# Patient Record
Sex: MALE | Race: WHITE | HISPANIC OR LATINO | Employment: FULL TIME | URBAN - METROPOLITAN AREA
[De-identification: names, ages, dates, MRNs, and addresses within clinical notes are randomized per-mention and may not be internally consistent; named-entity substitution may affect disease eponyms.]

---

## 2017-07-30 ENCOUNTER — APPOINTMENT (EMERGENCY)
Dept: CT IMAGING | Facility: HOSPITAL | Age: 42
DRG: 463 | End: 2017-07-30
Payer: COMMERCIAL

## 2017-07-30 ENCOUNTER — HOSPITAL ENCOUNTER (INPATIENT)
Facility: HOSPITAL | Age: 42
LOS: 3 days | Discharge: HOME | DRG: 463 | End: 2017-08-03
Attending: EMERGENCY MEDICINE | Admitting: INTERNAL MEDICINE
Payer: COMMERCIAL

## 2017-07-30 DIAGNOSIS — N20.0 KIDNEY STONE ON LEFT SIDE: Primary | ICD-10-CM

## 2017-07-30 DIAGNOSIS — D72.829 LEUKOCYTOSIS: ICD-10-CM

## 2017-07-30 DIAGNOSIS — N20.0 LEFT NEPHROLITHIASIS: ICD-10-CM

## 2017-07-30 DIAGNOSIS — N13.30 HYDRONEPHROSIS, LEFT: ICD-10-CM

## 2017-07-30 LAB
ALBUMIN SERPL BCP-MCNC: 3.9 G/DL (ref 3.5–5)
ALP SERPL-CCNC: 105 U/L (ref 46–116)
ALT SERPL W P-5'-P-CCNC: 38 U/L (ref 12–78)
ANION GAP SERPL CALCULATED.3IONS-SCNC: 11 MMOL/L (ref 4–13)
AST SERPL W P-5'-P-CCNC: 13 U/L (ref 5–45)
BASOPHILS # BLD AUTO: 0.02 THOUSANDS/ΜL (ref 0–0.1)
BASOPHILS NFR BLD AUTO: 0 % (ref 0–1)
BILIRUB SERPL-MCNC: 0.7 MG/DL (ref 0.2–1)
BUN SERPL-MCNC: 17 MG/DL (ref 5–25)
CALCIUM SERPL-MCNC: 9.4 MG/DL (ref 8.3–10.1)
CHLORIDE SERPL-SCNC: 100 MMOL/L (ref 100–108)
CO2 SERPL-SCNC: 23 MMOL/L (ref 21–32)
CREAT SERPL-MCNC: 1.34 MG/DL (ref 0.6–1.3)
EOSINOPHIL # BLD AUTO: 0.01 THOUSAND/ΜL (ref 0–0.61)
EOSINOPHIL NFR BLD AUTO: 0 % (ref 0–6)
ERYTHROCYTE [DISTWIDTH] IN BLOOD BY AUTOMATED COUNT: 12.5 % (ref 11.6–15.1)
GFR SERPL CREATININE-BSD FRML MDRD: 64 ML/MIN/1.73SQ M
GLUCOSE SERPL-MCNC: 116 MG/DL (ref 65–140)
HCT VFR BLD AUTO: 47.5 % (ref 36.5–49.3)
HGB BLD-MCNC: 17.1 G/DL (ref 12–17)
LIPASE SERPL-CCNC: 160 U/L (ref 73–393)
LYMPHOCYTES # BLD AUTO: 2.17 THOUSANDS/ΜL (ref 0.6–4.47)
LYMPHOCYTES NFR BLD AUTO: 10 % (ref 14–44)
MCH RBC QN AUTO: 32.4 PG (ref 26.8–34.3)
MCHC RBC AUTO-ENTMCNC: 36 G/DL (ref 31.4–37.4)
MCV RBC AUTO: 90 FL (ref 82–98)
MONOCYTES # BLD AUTO: 1.57 THOUSAND/ΜL (ref 0.17–1.22)
MONOCYTES NFR BLD AUTO: 7 % (ref 4–12)
NEUTROPHILS # BLD AUTO: 17.83 THOUSANDS/ΜL (ref 1.85–7.62)
NEUTS SEG NFR BLD AUTO: 82 % (ref 43–75)
NRBC BLD AUTO-RTO: 0 /100 WBCS
PLATELET # BLD AUTO: 209 THOUSANDS/UL (ref 149–390)
PMV BLD AUTO: 10.5 FL (ref 8.9–12.7)
POTASSIUM SERPL-SCNC: 4 MMOL/L (ref 3.5–5.3)
PROT SERPL-MCNC: 8.2 G/DL (ref 6.4–8.2)
RBC # BLD AUTO: 5.28 MILLION/UL (ref 3.88–5.62)
SODIUM SERPL-SCNC: 134 MMOL/L (ref 136–145)
WBC # BLD AUTO: 21.7 THOUSAND/UL (ref 4.31–10.16)

## 2017-07-30 PROCEDURE — 96375 TX/PRO/DX INJ NEW DRUG ADDON: CPT

## 2017-07-30 PROCEDURE — 83690 ASSAY OF LIPASE: CPT | Performed by: EMERGENCY MEDICINE

## 2017-07-30 PROCEDURE — 85025 COMPLETE CBC W/AUTO DIFF WBC: CPT | Performed by: EMERGENCY MEDICINE

## 2017-07-30 PROCEDURE — 80053 COMPREHEN METABOLIC PANEL: CPT | Performed by: EMERGENCY MEDICINE

## 2017-07-30 PROCEDURE — 74177 CT ABD & PELVIS W/CONTRAST: CPT

## 2017-07-30 PROCEDURE — 96365 THER/PROPH/DIAG IV INF INIT: CPT

## 2017-07-30 PROCEDURE — 36415 COLL VENOUS BLD VENIPUNCTURE: CPT | Performed by: EMERGENCY MEDICINE

## 2017-07-30 RX ORDER — KETOROLAC TROMETHAMINE 30 MG/ML
15 INJECTION, SOLUTION INTRAMUSCULAR; INTRAVENOUS ONCE
Status: COMPLETED | OUTPATIENT
Start: 2017-07-30 | End: 2017-07-30

## 2017-07-30 RX ORDER — ONDANSETRON 2 MG/ML
4 INJECTION INTRAMUSCULAR; INTRAVENOUS ONCE
Status: COMPLETED | OUTPATIENT
Start: 2017-07-30 | End: 2017-07-30

## 2017-07-30 RX ADMIN — CEFTRIAXONE 1000 MG: 1 INJECTION, POWDER, FOR SOLUTION INTRAMUSCULAR; INTRAVENOUS at 23:32

## 2017-07-30 RX ADMIN — HYDROMORPHONE HYDROCHLORIDE 1 MG: 1 INJECTION, SOLUTION INTRAMUSCULAR; INTRAVENOUS; SUBCUTANEOUS at 22:36

## 2017-07-30 RX ADMIN — KETOROLAC TROMETHAMINE 15 MG: 30 INJECTION, SOLUTION INTRAMUSCULAR at 21:23

## 2017-07-30 RX ADMIN — IOHEXOL 100 ML: 350 INJECTION, SOLUTION INTRAVENOUS at 22:00

## 2017-07-30 RX ADMIN — ONDANSETRON 4 MG: 2 INJECTION INTRAMUSCULAR; INTRAVENOUS at 21:24

## 2017-07-31 ENCOUNTER — APPOINTMENT (INPATIENT)
Dept: RADIOLOGY | Facility: HOSPITAL | Age: 42
DRG: 463 | End: 2017-07-31
Payer: COMMERCIAL

## 2017-07-31 ENCOUNTER — ANESTHESIA (INPATIENT)
Dept: PERIOP | Facility: HOSPITAL | Age: 42
DRG: 463 | End: 2017-07-31
Payer: COMMERCIAL

## 2017-07-31 ENCOUNTER — ANESTHESIA EVENT (INPATIENT)
Dept: PERIOP | Facility: HOSPITAL | Age: 42
DRG: 463 | End: 2017-07-31
Payer: COMMERCIAL

## 2017-07-31 PROBLEM — D72.829 LEUKOCYTOSIS: Status: ACTIVE | Noted: 2017-07-31

## 2017-07-31 PROBLEM — N13.30 HYDRONEPHROSIS, LEFT: Status: ACTIVE | Noted: 2017-07-31

## 2017-07-31 PROBLEM — N17.9 AKI (ACUTE KIDNEY INJURY) (HCC): Status: ACTIVE | Noted: 2017-07-31

## 2017-07-31 PROBLEM — N20.0 KIDNEY STONE ON LEFT SIDE: Status: ACTIVE | Noted: 2017-07-31

## 2017-07-31 PROBLEM — N20.0 LEFT NEPHROLITHIASIS: Status: ACTIVE | Noted: 2017-07-31

## 2017-07-31 PROBLEM — E78.5 HLD (HYPERLIPIDEMIA): Status: ACTIVE | Noted: 2017-07-31

## 2017-07-31 PROBLEM — Z72.0 TOBACCO USE: Status: ACTIVE | Noted: 2017-07-31

## 2017-07-31 PROBLEM — E87.1 HYPONATREMIA: Status: ACTIVE | Noted: 2017-07-31

## 2017-07-31 LAB
ANION GAP SERPL CALCULATED.3IONS-SCNC: 11 MMOL/L (ref 4–13)
APTT PPP: 32 SECONDS (ref 23–35)
BACTERIA UR QL AUTO: ABNORMAL /HPF
BACTERIA UR QL AUTO: ABNORMAL /HPF
BILIRUB UR QL STRIP: ABNORMAL
BILIRUB UR QL STRIP: NEGATIVE
BUN SERPL-MCNC: 18 MG/DL (ref 5–25)
CALCIUM SERPL-MCNC: 8.9 MG/DL (ref 8.3–10.1)
CHLORIDE SERPL-SCNC: 100 MMOL/L (ref 100–108)
CLARITY UR: ABNORMAL
CLARITY UR: ABNORMAL
CO2 SERPL-SCNC: 24 MMOL/L (ref 21–32)
COLOR UR: ABNORMAL
COLOR UR: YELLOW
CREAT SERPL-MCNC: 1.47 MG/DL (ref 0.6–1.3)
ERYTHROCYTE [DISTWIDTH] IN BLOOD BY AUTOMATED COUNT: 12.4 % (ref 11.6–15.1)
GFR SERPL CREATININE-BSD FRML MDRD: 58 ML/MIN/1.73SQ M
GLUCOSE P FAST SERPL-MCNC: 115 MG/DL (ref 65–99)
GLUCOSE SERPL-MCNC: 115 MG/DL (ref 65–140)
GLUCOSE UR STRIP-MCNC: NEGATIVE MG/DL
GLUCOSE UR STRIP-MCNC: NEGATIVE MG/DL
HCT VFR BLD AUTO: 46.9 % (ref 36.5–49.3)
HGB BLD-MCNC: 16.8 G/DL (ref 12–17)
HGB UR QL STRIP.AUTO: ABNORMAL
HGB UR QL STRIP.AUTO: ABNORMAL
INR PPP: 1.05 (ref 0.86–1.16)
KETONES UR STRIP-MCNC: ABNORMAL MG/DL
KETONES UR STRIP-MCNC: ABNORMAL MG/DL
LEUKOCYTE ESTERASE UR QL STRIP: ABNORMAL
LEUKOCYTE ESTERASE UR QL STRIP: NEGATIVE
MAGNESIUM SERPL-MCNC: 1.8 MG/DL (ref 1.6–2.6)
MCH RBC QN AUTO: 32.2 PG (ref 26.8–34.3)
MCHC RBC AUTO-ENTMCNC: 35.8 G/DL (ref 31.4–37.4)
MCV RBC AUTO: 90 FL (ref 82–98)
NITRITE UR QL STRIP: NEGATIVE
NITRITE UR QL STRIP: POSITIVE
NON-SQ EPI CELLS URNS QL MICRO: ABNORMAL /HPF
NON-SQ EPI CELLS URNS QL MICRO: ABNORMAL /HPF
PH UR STRIP.AUTO: 6 [PH] (ref 4.5–8)
PH UR STRIP.AUTO: 6.5 [PH] (ref 4.5–8)
PLATELET # BLD AUTO: 202 THOUSANDS/UL (ref 149–390)
PMV BLD AUTO: 10.8 FL (ref 8.9–12.7)
POTASSIUM SERPL-SCNC: 3.8 MMOL/L (ref 3.5–5.3)
PROT UR STRIP-MCNC: >=300 MG/DL
PROT UR STRIP-MCNC: ABNORMAL MG/DL
PROTHROMBIN TIME: 13.9 SECONDS (ref 12.1–14.4)
RBC # BLD AUTO: 5.21 MILLION/UL (ref 3.88–5.62)
RBC #/AREA URNS AUTO: ABNORMAL /HPF
RBC #/AREA URNS AUTO: ABNORMAL /HPF
SODIUM SERPL-SCNC: 135 MMOL/L (ref 136–145)
SP GR UR STRIP.AUTO: 1.01 (ref 1–1.03)
SP GR UR STRIP.AUTO: 1.01 (ref 1–1.03)
UROBILINOGEN UR QL STRIP.AUTO: 1 E.U./DL
UROBILINOGEN UR QL STRIP.AUTO: 2 E.U./DL
WBC # BLD AUTO: 22.63 THOUSAND/UL (ref 4.31–10.16)
WBC #/AREA URNS AUTO: ABNORMAL /HPF
WBC #/AREA URNS AUTO: ABNORMAL /HPF

## 2017-07-31 PROCEDURE — 0T778DZ DILATION OF LEFT URETER WITH INTRALUMINAL DEVICE, VIA NATURAL OR ARTIFICIAL OPENING ENDOSCOPIC: ICD-10-PCS | Performed by: UROLOGY

## 2017-07-31 PROCEDURE — BT1F1ZZ FLUOROSCOPY OF LEFT KIDNEY, URETER AND BLADDER USING LOW OSMOLAR CONTRAST: ICD-10-PCS | Performed by: UROLOGY

## 2017-07-31 PROCEDURE — 83735 ASSAY OF MAGNESIUM: CPT | Performed by: PHYSICIAN ASSISTANT

## 2017-07-31 PROCEDURE — 99285 EMERGENCY DEPT VISIT HI MDM: CPT

## 2017-07-31 PROCEDURE — C1769 GUIDE WIRE: HCPCS | Performed by: UROLOGY

## 2017-07-31 PROCEDURE — 85027 COMPLETE CBC AUTOMATED: CPT | Performed by: PHYSICIAN ASSISTANT

## 2017-07-31 PROCEDURE — 85730 THROMBOPLASTIN TIME PARTIAL: CPT | Performed by: PHYSICIAN ASSISTANT

## 2017-07-31 PROCEDURE — 81001 URINALYSIS AUTO W/SCOPE: CPT | Performed by: PHYSICIAN ASSISTANT

## 2017-07-31 PROCEDURE — 80048 BASIC METABOLIC PNL TOTAL CA: CPT | Performed by: PHYSICIAN ASSISTANT

## 2017-07-31 PROCEDURE — 87077 CULTURE AEROBIC IDENTIFY: CPT | Performed by: PHYSICIAN ASSISTANT

## 2017-07-31 PROCEDURE — C2617 STENT, NON-COR, TEM W/O DEL: HCPCS | Performed by: UROLOGY

## 2017-07-31 PROCEDURE — 85610 PROTHROMBIN TIME: CPT | Performed by: PHYSICIAN ASSISTANT

## 2017-07-31 PROCEDURE — 87186 SC STD MICRODIL/AGAR DIL: CPT | Performed by: PHYSICIAN ASSISTANT

## 2017-07-31 PROCEDURE — 87086 URINE CULTURE/COLONY COUNT: CPT | Performed by: PHYSICIAN ASSISTANT

## 2017-07-31 PROCEDURE — 74420 UROGRAPHY RTRGR +-KUB: CPT

## 2017-07-31 DEVICE — STENT URETERAL 6 FR 26CM INLAY OPTIMA: Type: IMPLANTABLE DEVICE | Site: URETER | Status: FUNCTIONAL

## 2017-07-31 RX ORDER — GLYCOPYRROLATE 0.2 MG/ML
INJECTION INTRAMUSCULAR; INTRAVENOUS AS NEEDED
Status: DISCONTINUED | OUTPATIENT
Start: 2017-07-31 | End: 2017-07-31 | Stop reason: SURG

## 2017-07-31 RX ORDER — LIDOCAINE HYDROCHLORIDE 10 MG/ML
INJECTION, SOLUTION INFILTRATION; PERINEURAL AS NEEDED
Status: DISCONTINUED | OUTPATIENT
Start: 2017-07-31 | End: 2017-07-31 | Stop reason: SURG

## 2017-07-31 RX ORDER — METOCLOPRAMIDE HYDROCHLORIDE 5 MG/ML
10 INJECTION INTRAMUSCULAR; INTRAVENOUS ONCE AS NEEDED
Status: DISCONTINUED | OUTPATIENT
Start: 2017-07-31 | End: 2017-07-31 | Stop reason: HOSPADM

## 2017-07-31 RX ORDER — CALCIUM CARBONATE 200(500)MG
1000 TABLET,CHEWABLE ORAL DAILY PRN
Status: DISCONTINUED | OUTPATIENT
Start: 2017-07-31 | End: 2017-08-03 | Stop reason: HOSPADM

## 2017-07-31 RX ORDER — DEXMEDETOMIDINE HYDROCHLORIDE 100 UG/ML
INJECTION, SOLUTION INTRAVENOUS AS NEEDED
Status: DISCONTINUED | OUTPATIENT
Start: 2017-07-31 | End: 2017-07-31 | Stop reason: SURG

## 2017-07-31 RX ORDER — PROMETHAZINE HYDROCHLORIDE 25 MG/ML
25 INJECTION, SOLUTION INTRAMUSCULAR; INTRAVENOUS ONCE AS NEEDED
Status: DISCONTINUED | OUTPATIENT
Start: 2017-07-31 | End: 2017-07-31 | Stop reason: HOSPADM

## 2017-07-31 RX ORDER — ONDANSETRON 2 MG/ML
4 INJECTION INTRAMUSCULAR; INTRAVENOUS EVERY 6 HOURS PRN
Status: DISCONTINUED | OUTPATIENT
Start: 2017-07-31 | End: 2017-08-03 | Stop reason: HOSPADM

## 2017-07-31 RX ORDER — SODIUM CHLORIDE, SODIUM LACTATE, POTASSIUM CHLORIDE, CALCIUM CHLORIDE 600; 310; 30; 20 MG/100ML; MG/100ML; MG/100ML; MG/100ML
INJECTION, SOLUTION INTRAVENOUS CONTINUOUS PRN
Status: DISCONTINUED | OUTPATIENT
Start: 2017-07-31 | End: 2017-07-31 | Stop reason: SURG

## 2017-07-31 RX ORDER — FENTANYL CITRATE/PF 50 MCG/ML
25 SYRINGE (ML) INJECTION
Status: DISCONTINUED | OUTPATIENT
Start: 2017-07-31 | End: 2017-07-31 | Stop reason: HOSPADM

## 2017-07-31 RX ORDER — TAMSULOSIN HYDROCHLORIDE 0.4 MG/1
0.4 CAPSULE ORAL
Status: DISCONTINUED | OUTPATIENT
Start: 2017-07-31 | End: 2017-08-03 | Stop reason: HOSPADM

## 2017-07-31 RX ORDER — NICOTINE 21 MG/24HR
1 PATCH, TRANSDERMAL 24 HOURS TRANSDERMAL DAILY
Status: DISCONTINUED | OUTPATIENT
Start: 2017-07-31 | End: 2017-08-03 | Stop reason: HOSPADM

## 2017-07-31 RX ORDER — FENTANYL CITRATE 50 UG/ML
INJECTION, SOLUTION INTRAMUSCULAR; INTRAVENOUS AS NEEDED
Status: DISCONTINUED | OUTPATIENT
Start: 2017-07-31 | End: 2017-07-31 | Stop reason: SURG

## 2017-07-31 RX ORDER — HYDROMORPHONE HCL 110MG/55ML
1.5 PATIENT CONTROLLED ANALGESIA SYRINGE INTRAVENOUS ONCE
Status: COMPLETED | OUTPATIENT
Start: 2017-07-31 | End: 2017-07-31

## 2017-07-31 RX ORDER — ONDANSETRON 2 MG/ML
4 INJECTION INTRAMUSCULAR; INTRAVENOUS ONCE AS NEEDED
Status: DISCONTINUED | OUTPATIENT
Start: 2017-07-31 | End: 2017-07-31 | Stop reason: HOSPADM

## 2017-07-31 RX ORDER — ONDANSETRON 2 MG/ML
INJECTION INTRAMUSCULAR; INTRAVENOUS AS NEEDED
Status: DISCONTINUED | OUTPATIENT
Start: 2017-07-31 | End: 2017-07-31 | Stop reason: SURG

## 2017-07-31 RX ORDER — ACETAMINOPHEN 325 MG/1
650 TABLET ORAL EVERY 6 HOURS PRN
Status: DISCONTINUED | OUTPATIENT
Start: 2017-07-31 | End: 2017-08-03 | Stop reason: HOSPADM

## 2017-07-31 RX ORDER — PROPOFOL 10 MG/ML
INJECTION, EMULSION INTRAVENOUS AS NEEDED
Status: DISCONTINUED | OUTPATIENT
Start: 2017-07-31 | End: 2017-07-31 | Stop reason: SURG

## 2017-07-31 RX ORDER — OXYCODONE HYDROCHLORIDE 5 MG/1
5 TABLET ORAL EVERY 4 HOURS PRN
Status: DISCONTINUED | OUTPATIENT
Start: 2017-07-31 | End: 2017-08-03

## 2017-07-31 RX ORDER — SODIUM CHLORIDE 9 MG/ML
125 INJECTION, SOLUTION INTRAVENOUS CONTINUOUS
Status: DISCONTINUED | OUTPATIENT
Start: 2017-07-31 | End: 2017-08-01

## 2017-07-31 RX ORDER — SUCCINYLCHOLINE CHLORIDE 20 MG/ML
INJECTION INTRAMUSCULAR; INTRAVENOUS AS NEEDED
Status: DISCONTINUED | OUTPATIENT
Start: 2017-07-31 | End: 2017-07-31 | Stop reason: SURG

## 2017-07-31 RX ORDER — MIDAZOLAM HYDROCHLORIDE 1 MG/ML
INJECTION INTRAMUSCULAR; INTRAVENOUS AS NEEDED
Status: DISCONTINUED | OUTPATIENT
Start: 2017-07-31 | End: 2017-07-31 | Stop reason: SURG

## 2017-07-31 RX ADMIN — SUCCINYLCHOLINE CHLORIDE 120 MG: 20 INJECTION, SOLUTION INTRAMUSCULAR; INTRAVENOUS at 16:30

## 2017-07-31 RX ADMIN — PROPOFOL 200 MG: 10 INJECTION, EMULSION INTRAVENOUS at 16:30

## 2017-07-31 RX ADMIN — CEFTRIAXONE 1000 MG: 1 INJECTION, POWDER, FOR SOLUTION INTRAMUSCULAR; INTRAVENOUS at 21:03

## 2017-07-31 RX ADMIN — OXYCODONE HYDROCHLORIDE 5 MG: 5 TABLET ORAL at 02:51

## 2017-07-31 RX ADMIN — SODIUM CHLORIDE 125 ML/HR: 0.9 INJECTION, SOLUTION INTRAVENOUS at 17:15

## 2017-07-31 RX ADMIN — FENTANYL CITRATE 100 MCG: 50 INJECTION, SOLUTION INTRAMUSCULAR; INTRAVENOUS at 16:25

## 2017-07-31 RX ADMIN — NEOSTIGMINE METHYLSULFATE 4 MG: 1 INJECTION, SOLUTION INTRAMUSCULAR; INTRAVENOUS; SUBCUTANEOUS at 16:30

## 2017-07-31 RX ADMIN — ONDANSETRON 4 MG: 2 INJECTION INTRAMUSCULAR; INTRAVENOUS at 16:30

## 2017-07-31 RX ADMIN — NICOTINE 1 PATCH: 14 PATCH, EXTENDED RELEASE TRANSDERMAL at 09:20

## 2017-07-31 RX ADMIN — HYDROMORPHONE HYDROCHLORIDE 1 MG: 1 INJECTION, SOLUTION INTRAMUSCULAR; INTRAVENOUS; SUBCUTANEOUS at 09:21

## 2017-07-31 RX ADMIN — SODIUM CHLORIDE 125 ML/HR: 0.9 INJECTION, SOLUTION INTRAVENOUS at 02:43

## 2017-07-31 RX ADMIN — OXYCODONE HYDROCHLORIDE 5 MG: 5 TABLET ORAL at 10:38

## 2017-07-31 RX ADMIN — HYDROMORPHONE HYDROCHLORIDE 1.5 MG: 2 INJECTION, SOLUTION INTRAMUSCULAR; INTRAVENOUS; SUBCUTANEOUS at 13:53

## 2017-07-31 RX ADMIN — DEXMEDETOMIDINE 8 MCG: 100 INJECTION, SOLUTION, CONCENTRATE INTRAVENOUS at 16:35

## 2017-07-31 RX ADMIN — OXYCODONE HYDROCHLORIDE 5 MG: 5 TABLET ORAL at 19:13

## 2017-07-31 RX ADMIN — GLYCOPYRROLATE 0.2 MG: 0.2 INJECTION, SOLUTION INTRAMUSCULAR; INTRAVENOUS at 16:25

## 2017-07-31 RX ADMIN — ONDANSETRON 4 MG: 2 INJECTION INTRAMUSCULAR; INTRAVENOUS at 06:16

## 2017-07-31 RX ADMIN — NICOTINE 1 PATCH: 14 PATCH, EXTENDED RELEASE TRANSDERMAL at 21:06

## 2017-07-31 RX ADMIN — TAMSULOSIN HYDROCHLORIDE 0.4 MG: 0.4 CAPSULE ORAL at 16:30

## 2017-07-31 RX ADMIN — OXYCODONE HYDROCHLORIDE 5 MG: 5 TABLET ORAL at 14:36

## 2017-07-31 RX ADMIN — MIDAZOLAM HYDROCHLORIDE 2 MG: 1 INJECTION, SOLUTION INTRAMUSCULAR; INTRAVENOUS at 16:25

## 2017-07-31 RX ADMIN — HYDROMORPHONE HYDROCHLORIDE 1 MG: 1 INJECTION, SOLUTION INTRAMUSCULAR; INTRAVENOUS; SUBCUTANEOUS at 04:14

## 2017-07-31 RX ADMIN — HYDROMORPHONE HYDROCHLORIDE 1 MG: 1 INJECTION, SOLUTION INTRAMUSCULAR; INTRAVENOUS; SUBCUTANEOUS at 12:47

## 2017-07-31 RX ADMIN — HYDROMORPHONE HYDROCHLORIDE 1 MG: 1 INJECTION, SOLUTION INTRAMUSCULAR; INTRAVENOUS; SUBCUTANEOUS at 00:35

## 2017-07-31 RX ADMIN — LIDOCAINE HYDROCHLORIDE 100 MG: 10 INJECTION, SOLUTION INFILTRATION; PERINEURAL at 16:30

## 2017-07-31 RX ADMIN — SODIUM CHLORIDE, SODIUM LACTATE, POTASSIUM CHLORIDE, AND CALCIUM CHLORIDE: .6; .31; .03; .02 INJECTION, SOLUTION INTRAVENOUS at 16:22

## 2017-07-31 RX ADMIN — SODIUM CHLORIDE 125 ML/HR: 0.9 INJECTION, SOLUTION INTRAVENOUS at 10:40

## 2017-07-31 RX ADMIN — HYDROMORPHONE HYDROCHLORIDE 1 MG: 1 INJECTION, SOLUTION INTRAMUSCULAR; INTRAVENOUS; SUBCUTANEOUS at 17:52

## 2017-07-31 RX ADMIN — HYDROMORPHONE HYDROCHLORIDE 1 MG: 1 INJECTION, SOLUTION INTRAMUSCULAR; INTRAVENOUS; SUBCUTANEOUS at 21:10

## 2017-08-01 LAB
ANION GAP SERPL CALCULATED.3IONS-SCNC: 8 MMOL/L (ref 4–13)
BASOPHILS # BLD AUTO: 0.01 THOUSANDS/ΜL (ref 0–0.1)
BASOPHILS NFR BLD AUTO: 0 % (ref 0–1)
BUN SERPL-MCNC: 15 MG/DL (ref 5–25)
CALCIUM SERPL-MCNC: 9.3 MG/DL (ref 8.3–10.1)
CHLORIDE SERPL-SCNC: 101 MMOL/L (ref 100–108)
CHOLEST SERPL-MCNC: 246 MG/DL (ref 50–200)
CO2 SERPL-SCNC: 26 MMOL/L (ref 21–32)
CREAT SERPL-MCNC: 0.98 MG/DL (ref 0.6–1.3)
EOSINOPHIL # BLD AUTO: 0 THOUSAND/ΜL (ref 0–0.61)
EOSINOPHIL NFR BLD AUTO: 0 % (ref 0–6)
ERYTHROCYTE [DISTWIDTH] IN BLOOD BY AUTOMATED COUNT: 12.7 % (ref 11.6–15.1)
GFR SERPL CREATININE-BSD FRML MDRD: 93 ML/MIN/1.73SQ M
GLUCOSE SERPL-MCNC: 126 MG/DL (ref 65–140)
HCT VFR BLD AUTO: 41.9 % (ref 36.5–49.3)
HDLC SERPL-MCNC: 41 MG/DL (ref 40–60)
HGB BLD-MCNC: 14.8 G/DL (ref 12–17)
LDLC SERPL CALC-MCNC: 189 MG/DL (ref 0–100)
LYMPHOCYTES # BLD AUTO: 1.24 THOUSANDS/ΜL (ref 0.6–4.47)
LYMPHOCYTES NFR BLD AUTO: 8 % (ref 14–44)
MCH RBC QN AUTO: 32.4 PG (ref 26.8–34.3)
MCHC RBC AUTO-ENTMCNC: 35.3 G/DL (ref 31.4–37.4)
MCV RBC AUTO: 92 FL (ref 82–98)
MONOCYTES # BLD AUTO: 0.9 THOUSAND/ΜL (ref 0.17–1.22)
MONOCYTES NFR BLD AUTO: 6 % (ref 4–12)
NEUTROPHILS # BLD AUTO: 12.91 THOUSANDS/ΜL (ref 1.85–7.62)
NEUTS SEG NFR BLD AUTO: 85 % (ref 43–75)
NRBC BLD AUTO-RTO: 0 /100 WBCS
PLATELET # BLD AUTO: 182 THOUSANDS/UL (ref 149–390)
PMV BLD AUTO: 11 FL (ref 8.9–12.7)
POTASSIUM SERPL-SCNC: 4.3 MMOL/L (ref 3.5–5.3)
RBC # BLD AUTO: 4.57 MILLION/UL (ref 3.88–5.62)
SODIUM SERPL-SCNC: 135 MMOL/L (ref 136–145)
TRIGL SERPL-MCNC: 78 MG/DL
WBC # BLD AUTO: 15.13 THOUSAND/UL (ref 4.31–10.16)

## 2017-08-01 PROCEDURE — 80048 BASIC METABOLIC PNL TOTAL CA: CPT | Performed by: PHYSICIAN ASSISTANT

## 2017-08-01 PROCEDURE — 85025 COMPLETE CBC W/AUTO DIFF WBC: CPT | Performed by: PHYSICIAN ASSISTANT

## 2017-08-01 PROCEDURE — 80061 LIPID PANEL: CPT | Performed by: PHYSICIAN ASSISTANT

## 2017-08-01 RX ORDER — OXYBUTYNIN CHLORIDE 5 MG/1
5 TABLET ORAL 3 TIMES DAILY
Status: DISCONTINUED | OUTPATIENT
Start: 2017-08-01 | End: 2017-08-03 | Stop reason: HOSPADM

## 2017-08-01 RX ORDER — DOCUSATE SODIUM 100 MG/1
100 CAPSULE, LIQUID FILLED ORAL 2 TIMES DAILY
Status: DISCONTINUED | OUTPATIENT
Start: 2017-08-01 | End: 2017-08-03 | Stop reason: HOSPADM

## 2017-08-01 RX ORDER — KETOROLAC TROMETHAMINE 30 MG/ML
15 INJECTION, SOLUTION INTRAMUSCULAR; INTRAVENOUS EVERY 6 HOURS SCHEDULED
Status: COMPLETED | OUTPATIENT
Start: 2017-08-01 | End: 2017-08-03

## 2017-08-01 RX ADMIN — OXYCODONE HYDROCHLORIDE 5 MG: 5 TABLET ORAL at 01:24

## 2017-08-01 RX ADMIN — SODIUM CHLORIDE 125 ML/HR: 0.9 INJECTION, SOLUTION INTRAVENOUS at 01:26

## 2017-08-01 RX ADMIN — NICOTINE 1 PATCH: 14 PATCH, EXTENDED RELEASE TRANSDERMAL at 08:28

## 2017-08-01 RX ADMIN — CEFTRIAXONE 1000 MG: 1 INJECTION, POWDER, FOR SOLUTION INTRAMUSCULAR; INTRAVENOUS at 22:26

## 2017-08-01 RX ADMIN — KETOROLAC TROMETHAMINE 15 MG: 30 INJECTION, SOLUTION INTRAMUSCULAR at 17:31

## 2017-08-01 RX ADMIN — KETOROLAC TROMETHAMINE 15 MG: 30 INJECTION, SOLUTION INTRAMUSCULAR at 11:37

## 2017-08-01 RX ADMIN — TAMSULOSIN HYDROCHLORIDE 0.4 MG: 0.4 CAPSULE ORAL at 15:37

## 2017-08-01 RX ADMIN — OXYBUTYNIN CHLORIDE 5 MG: 5 TABLET ORAL at 15:37

## 2017-08-01 RX ADMIN — HYDROMORPHONE HYDROCHLORIDE 1 MG: 1 INJECTION, SOLUTION INTRAMUSCULAR; INTRAVENOUS; SUBCUTANEOUS at 04:06

## 2017-08-01 RX ADMIN — OXYBUTYNIN CHLORIDE 5 MG: 5 TABLET ORAL at 08:25

## 2017-08-01 RX ADMIN — HYDROMORPHONE HYDROCHLORIDE 1 MG: 1 INJECTION, SOLUTION INTRAMUSCULAR; INTRAVENOUS; SUBCUTANEOUS at 08:27

## 2017-08-01 RX ADMIN — SODIUM CHLORIDE 125 ML/HR: 0.9 INJECTION, SOLUTION INTRAVENOUS at 10:21

## 2017-08-01 RX ADMIN — HYDROMORPHONE HYDROCHLORIDE 1 MG: 1 INJECTION, SOLUTION INTRAMUSCULAR; INTRAVENOUS; SUBCUTANEOUS at 20:33

## 2017-08-01 RX ADMIN — HYDROMORPHONE HYDROCHLORIDE 1 MG: 1 INJECTION, SOLUTION INTRAMUSCULAR; INTRAVENOUS; SUBCUTANEOUS at 00:10

## 2017-08-01 RX ADMIN — DOCUSATE SODIUM 100 MG: 100 CAPSULE, LIQUID FILLED ORAL at 08:25

## 2017-08-01 RX ADMIN — OXYCODONE HYDROCHLORIDE 5 MG: 5 TABLET ORAL at 17:31

## 2017-08-01 RX ADMIN — DOCUSATE SODIUM 100 MG: 100 CAPSULE, LIQUID FILLED ORAL at 17:31

## 2017-08-01 RX ADMIN — HYDROMORPHONE HYDROCHLORIDE 1 MG: 1 INJECTION, SOLUTION INTRAMUSCULAR; INTRAVENOUS; SUBCUTANEOUS at 15:37

## 2017-08-01 RX ADMIN — OXYBUTYNIN CHLORIDE 5 MG: 5 TABLET ORAL at 22:25

## 2017-08-01 RX ADMIN — OXYCODONE HYDROCHLORIDE 5 MG: 5 TABLET ORAL at 06:17

## 2017-08-02 LAB
BACTERIA UR CULT: NORMAL
ERYTHROCYTE [DISTWIDTH] IN BLOOD BY AUTOMATED COUNT: 12.8 % (ref 11.6–15.1)
HCT VFR BLD AUTO: 40.4 % (ref 36.5–49.3)
HGB BLD-MCNC: 14.1 G/DL (ref 12–17)
MCH RBC QN AUTO: 32 PG (ref 26.8–34.3)
MCHC RBC AUTO-ENTMCNC: 34.9 G/DL (ref 31.4–37.4)
MCV RBC AUTO: 92 FL (ref 82–98)
PLATELET # BLD AUTO: 187 THOUSANDS/UL (ref 149–390)
PMV BLD AUTO: 10.6 FL (ref 8.9–12.7)
RBC # BLD AUTO: 4.41 MILLION/UL (ref 3.88–5.62)
WBC # BLD AUTO: 9.61 THOUSAND/UL (ref 4.31–10.16)

## 2017-08-02 PROCEDURE — 85027 COMPLETE CBC AUTOMATED: CPT | Performed by: INTERNAL MEDICINE

## 2017-08-02 RX ORDER — GUAIFENESIN/DEXTROMETHORPHAN 100-10MG/5
10 SYRUP ORAL EVERY 4 HOURS PRN
Status: DISCONTINUED | OUTPATIENT
Start: 2017-08-02 | End: 2017-08-03 | Stop reason: HOSPADM

## 2017-08-02 RX ADMIN — OXYBUTYNIN CHLORIDE 5 MG: 5 TABLET ORAL at 22:00

## 2017-08-02 RX ADMIN — HYDROMORPHONE HYDROCHLORIDE 1 MG: 1 INJECTION, SOLUTION INTRAMUSCULAR; INTRAVENOUS; SUBCUTANEOUS at 04:02

## 2017-08-02 RX ADMIN — GUAIFENESIN AND DEXTROMETHORPHAN 10 ML: 100; 10 SYRUP ORAL at 17:18

## 2017-08-02 RX ADMIN — OXYBUTYNIN CHLORIDE 5 MG: 5 TABLET ORAL at 08:28

## 2017-08-02 RX ADMIN — DOCUSATE SODIUM 100 MG: 100 CAPSULE, LIQUID FILLED ORAL at 17:18

## 2017-08-02 RX ADMIN — KETOROLAC TROMETHAMINE 15 MG: 30 INJECTION, SOLUTION INTRAMUSCULAR at 06:14

## 2017-08-02 RX ADMIN — OXYBUTYNIN CHLORIDE 5 MG: 5 TABLET ORAL at 17:18

## 2017-08-02 RX ADMIN — KETOROLAC TROMETHAMINE 15 MG: 30 INJECTION, SOLUTION INTRAMUSCULAR at 17:49

## 2017-08-02 RX ADMIN — DOCUSATE SODIUM 100 MG: 100 CAPSULE, LIQUID FILLED ORAL at 08:28

## 2017-08-02 RX ADMIN — KETOROLAC TROMETHAMINE 15 MG: 30 INJECTION, SOLUTION INTRAMUSCULAR at 12:57

## 2017-08-02 RX ADMIN — TAMSULOSIN HYDROCHLORIDE 0.4 MG: 0.4 CAPSULE ORAL at 17:18

## 2017-08-02 RX ADMIN — KETOROLAC TROMETHAMINE 15 MG: 30 INJECTION, SOLUTION INTRAMUSCULAR at 00:06

## 2017-08-02 RX ADMIN — HYDROMORPHONE HYDROCHLORIDE 1 MG: 1 INJECTION, SOLUTION INTRAMUSCULAR; INTRAVENOUS; SUBCUTANEOUS at 17:50

## 2017-08-02 RX ADMIN — CEFTRIAXONE 1000 MG: 1 INJECTION, POWDER, FOR SOLUTION INTRAMUSCULAR; INTRAVENOUS at 22:02

## 2017-08-02 RX ADMIN — NICOTINE 1 PATCH: 14 PATCH, EXTENDED RELEASE TRANSDERMAL at 08:28

## 2017-08-02 RX ADMIN — GUAIFENESIN AND DEXTROMETHORPHAN 10 ML: 100; 10 SYRUP ORAL at 22:14

## 2017-08-02 RX ADMIN — HYDROMORPHONE HYDROCHLORIDE 1 MG: 1 INJECTION, SOLUTION INTRAMUSCULAR; INTRAVENOUS; SUBCUTANEOUS at 22:02

## 2017-08-03 VITALS
TEMPERATURE: 99.1 F | BODY MASS INDEX: 36.1 KG/M2 | DIASTOLIC BLOOD PRESSURE: 82 MMHG | HEART RATE: 58 BPM | RESPIRATION RATE: 18 BRPM | SYSTOLIC BLOOD PRESSURE: 148 MMHG | WEIGHT: 230 LBS | HEIGHT: 67 IN | OXYGEN SATURATION: 97 %

## 2017-08-03 LAB
ANION GAP SERPL CALCULATED.3IONS-SCNC: 9 MMOL/L (ref 4–13)
BUN SERPL-MCNC: 24 MG/DL (ref 5–25)
CALCIUM SERPL-MCNC: 8.5 MG/DL (ref 8.3–10.1)
CHLORIDE SERPL-SCNC: 104 MMOL/L (ref 100–108)
CO2 SERPL-SCNC: 27 MMOL/L (ref 21–32)
CREAT SERPL-MCNC: 1.06 MG/DL (ref 0.6–1.3)
ERYTHROCYTE [DISTWIDTH] IN BLOOD BY AUTOMATED COUNT: 12.5 % (ref 11.6–15.1)
GFR SERPL CREATININE-BSD FRML MDRD: 85 ML/MIN/1.73SQ M
GLUCOSE SERPL-MCNC: 104 MG/DL (ref 65–140)
HCT VFR BLD AUTO: 39.4 % (ref 36.5–49.3)
HGB BLD-MCNC: 13.8 G/DL (ref 12–17)
MCH RBC QN AUTO: 32 PG (ref 26.8–34.3)
MCHC RBC AUTO-ENTMCNC: 35 G/DL (ref 31.4–37.4)
MCV RBC AUTO: 91 FL (ref 82–98)
PLATELET # BLD AUTO: 194 THOUSANDS/UL (ref 149–390)
PMV BLD AUTO: 10.3 FL (ref 8.9–12.7)
POTASSIUM SERPL-SCNC: 3.8 MMOL/L (ref 3.5–5.3)
RBC # BLD AUTO: 4.31 MILLION/UL (ref 3.88–5.62)
SODIUM SERPL-SCNC: 140 MMOL/L (ref 136–145)
WBC # BLD AUTO: 8 THOUSAND/UL (ref 4.31–10.16)

## 2017-08-03 PROCEDURE — 85027 COMPLETE CBC AUTOMATED: CPT | Performed by: INTERNAL MEDICINE

## 2017-08-03 PROCEDURE — 80048 BASIC METABOLIC PNL TOTAL CA: CPT | Performed by: INTERNAL MEDICINE

## 2017-08-03 RX ORDER — CEPHALEXIN 500 MG/1
500 CAPSULE ORAL EVERY 6 HOURS SCHEDULED
Qty: 40 CAPSULE | Refills: 0 | Status: SHIPPED | OUTPATIENT
Start: 2017-08-03 | End: 2017-08-03

## 2017-08-03 RX ORDER — OXYBUTYNIN CHLORIDE 5 MG/1
5 TABLET ORAL 3 TIMES DAILY
Qty: 90 TABLET | Refills: 0 | Status: SHIPPED | OUTPATIENT
Start: 2017-08-03 | End: 2017-08-03

## 2017-08-03 RX ORDER — TRAMADOL HYDROCHLORIDE 50 MG/1
50 TABLET ORAL EVERY 6 HOURS PRN
Qty: 30 TABLET | Refills: 0 | Status: SHIPPED | OUTPATIENT
Start: 2017-08-03 | End: 2017-08-03

## 2017-08-03 RX ORDER — CEPHALEXIN 500 MG/1
500 CAPSULE ORAL EVERY 6 HOURS SCHEDULED
Qty: 40 CAPSULE | Refills: 0 | Status: SHIPPED | OUTPATIENT
Start: 2017-08-03 | End: 2017-08-13

## 2017-08-03 RX ORDER — KETOROLAC TROMETHAMINE 30 MG/ML
15 INJECTION, SOLUTION INTRAMUSCULAR; INTRAVENOUS ONCE
Status: COMPLETED | OUTPATIENT
Start: 2017-08-03 | End: 2017-08-03

## 2017-08-03 RX ORDER — TRAMADOL HYDROCHLORIDE 50 MG/1
50 TABLET ORAL EVERY 6 HOURS PRN
Qty: 60 TABLET | Refills: 0 | Status: SHIPPED | OUTPATIENT
Start: 2017-08-03 | End: 2017-08-13

## 2017-08-03 RX ORDER — CEPHALEXIN 500 MG/1
500 CAPSULE ORAL EVERY 6 HOURS SCHEDULED
Status: DISCONTINUED | OUTPATIENT
Start: 2017-08-03 | End: 2017-08-03 | Stop reason: HOSPADM

## 2017-08-03 RX ORDER — TAMSULOSIN HYDROCHLORIDE 0.4 MG/1
0.4 CAPSULE ORAL
Qty: 30 CAPSULE | Refills: 0 | Status: SHIPPED | OUTPATIENT
Start: 2017-08-03 | End: 2017-08-03

## 2017-08-03 RX ORDER — TRAMADOL HYDROCHLORIDE 50 MG/1
50 TABLET ORAL EVERY 6 HOURS PRN
Status: DISCONTINUED | OUTPATIENT
Start: 2017-08-03 | End: 2017-08-03 | Stop reason: HOSPADM

## 2017-08-03 RX ORDER — OXYBUTYNIN CHLORIDE 5 MG/1
5 TABLET ORAL 3 TIMES DAILY
Qty: 90 TABLET | Refills: 0 | Status: SHIPPED | OUTPATIENT
Start: 2017-08-03

## 2017-08-03 RX ORDER — TAMSULOSIN HYDROCHLORIDE 0.4 MG/1
0.4 CAPSULE ORAL
Qty: 30 CAPSULE | Refills: 0 | Status: SHIPPED | OUTPATIENT
Start: 2017-08-03

## 2017-08-03 RX ADMIN — KETOROLAC TROMETHAMINE 15 MG: 30 INJECTION, SOLUTION INTRAMUSCULAR at 08:55

## 2017-08-03 RX ADMIN — OXYBUTYNIN CHLORIDE 5 MG: 5 TABLET ORAL at 08:55

## 2017-08-03 RX ADMIN — CEPHALEXIN 500 MG: 500 CAPSULE ORAL at 08:57

## 2017-08-03 RX ADMIN — HYDROMORPHONE HYDROCHLORIDE 1 MG: 1 INJECTION, SOLUTION INTRAMUSCULAR; INTRAVENOUS; SUBCUTANEOUS at 03:11

## 2017-08-03 RX ADMIN — NICOTINE 1 PATCH: 14 PATCH, EXTENDED RELEASE TRANSDERMAL at 08:58

## 2017-08-03 RX ADMIN — KETOROLAC TROMETHAMINE 15 MG: 30 INJECTION, SOLUTION INTRAMUSCULAR at 01:08

## 2017-08-03 RX ADMIN — DOCUSATE SODIUM 100 MG: 100 CAPSULE, LIQUID FILLED ORAL at 08:55

## 2017-08-03 RX ADMIN — KETOROLAC TROMETHAMINE 15 MG: 30 INJECTION, SOLUTION INTRAMUSCULAR at 05:08

## 2017-08-07 ENCOUNTER — ALLSCRIPTS OFFICE VISIT (OUTPATIENT)
Dept: OTHER | Facility: OTHER | Age: 42
End: 2017-08-07

## 2017-08-07 LAB
BILIRUB UR QL STRIP: NORMAL
CLARITY UR: NORMAL
COLOR UR: YELLOW
GLUCOSE (HISTORICAL): NORMAL
HGB UR QL STRIP.AUTO: NORMAL
KETONES UR STRIP-MCNC: NORMAL MG/DL
LEUKOCYTE ESTERASE UR QL STRIP: NORMAL
NITRITE UR QL STRIP: NORMAL
PH UR STRIP.AUTO: 6 [PH]
PROT UR STRIP-MCNC: NORMAL MG/DL
SP GR UR STRIP.AUTO: 1.02
UROBILINOGEN UR QL STRIP.AUTO: 1

## 2017-08-12 ENCOUNTER — TRANSCRIBE ORDERS (OUTPATIENT)
Dept: ADMINISTRATIVE | Facility: HOSPITAL | Age: 42
End: 2017-08-12

## 2017-08-12 ENCOUNTER — APPOINTMENT (OUTPATIENT)
Dept: LAB | Facility: HOSPITAL | Age: 42
End: 2017-08-12
Attending: UROLOGY
Payer: COMMERCIAL

## 2017-08-12 ENCOUNTER — HOSPITAL ENCOUNTER (OUTPATIENT)
Dept: RADIOLOGY | Facility: HOSPITAL | Age: 42
Discharge: HOME | End: 2017-08-12
Attending: UROLOGY
Payer: COMMERCIAL

## 2017-08-12 ENCOUNTER — OFFICE VISIT (OUTPATIENT)
Dept: LAB | Facility: HOSPITAL | Age: 42
End: 2017-08-12
Attending: UROLOGY
Payer: COMMERCIAL

## 2017-08-12 DIAGNOSIS — N20.1 CALCULUS OF URETER: ICD-10-CM

## 2017-08-12 DIAGNOSIS — N20.1 CALCULUS OF URETER: Primary | ICD-10-CM

## 2017-08-12 LAB
APTT PPP: 27 SECONDS (ref 23–35)
BACTERIA UR QL AUTO: ABNORMAL /HPF
BILIRUB UR QL STRIP: NEGATIVE
CLARITY UR: ABNORMAL
COLOR UR: YELLOW
GLUCOSE UR STRIP-MCNC: NEGATIVE MG/DL
HGB UR QL STRIP.AUTO: ABNORMAL
INR PPP: 0.92 (ref 0.86–1.16)
KETONES UR STRIP-MCNC: NEGATIVE MG/DL
LEUKOCYTE ESTERASE UR QL STRIP: ABNORMAL
NITRITE UR QL STRIP: NEGATIVE
NON-SQ EPI CELLS URNS QL MICRO: ABNORMAL /HPF
PH UR STRIP.AUTO: 7.5 [PH] (ref 4.5–8)
PROT UR STRIP-MCNC: ABNORMAL MG/DL
PROTHROMBIN TIME: 12.6 SECONDS (ref 12.1–14.4)
RBC #/AREA URNS AUTO: ABNORMAL /HPF
SP GR UR STRIP.AUTO: 1.02 (ref 1–1.03)
UROBILINOGEN UR QL STRIP.AUTO: 1 E.U./DL
WBC #/AREA URNS AUTO: ABNORMAL /HPF

## 2017-08-12 PROCEDURE — 36415 COLL VENOUS BLD VENIPUNCTURE: CPT

## 2017-08-12 PROCEDURE — 81001 URINALYSIS AUTO W/SCOPE: CPT | Performed by: UROLOGY

## 2017-08-12 PROCEDURE — 85610 PROTHROMBIN TIME: CPT

## 2017-08-12 PROCEDURE — 93005 ELECTROCARDIOGRAM TRACING: CPT

## 2017-08-12 PROCEDURE — 85730 THROMBOPLASTIN TIME PARTIAL: CPT

## 2017-08-12 PROCEDURE — 71020 HB CHEST X-RAY 2VW FRONTAL&LATL: CPT

## 2017-08-14 LAB
ATRIAL RATE: 57 BPM
P AXIS: 34 DEGREES
PR INTERVAL: 162 MS
QRS AXIS: -3 DEGREES
QRSD INTERVAL: 92 MS
QT INTERVAL: 412 MS
QTC INTERVAL: 401 MS
T WAVE AXIS: 28 DEGREES
VENTRICULAR RATE: 57 BPM

## 2017-08-16 ENCOUNTER — GENERIC CONVERSION - ENCOUNTER (OUTPATIENT)
Dept: OTHER | Facility: OTHER | Age: 42
End: 2017-08-16

## 2017-08-23 ENCOUNTER — ANESTHESIA EVENT (OUTPATIENT)
Dept: PERIOP | Facility: HOSPITAL | Age: 42
End: 2017-08-23
Payer: COMMERCIAL

## 2017-08-24 ENCOUNTER — HOSPITAL ENCOUNTER (OUTPATIENT)
Facility: HOSPITAL | Age: 42
Setting detail: OUTPATIENT SURGERY
Discharge: HOME | End: 2017-08-24
Attending: UROLOGY | Admitting: UROLOGY
Payer: COMMERCIAL

## 2017-08-24 ENCOUNTER — ANESTHESIA (OUTPATIENT)
Dept: PERIOP | Facility: HOSPITAL | Age: 42
End: 2017-08-24
Payer: COMMERCIAL

## 2017-08-24 ENCOUNTER — HOSPITAL ENCOUNTER (OUTPATIENT)
Dept: RADIOLOGY | Facility: HOSPITAL | Age: 42
Setting detail: OUTPATIENT SURGERY
Discharge: HOME | End: 2017-08-24
Payer: COMMERCIAL

## 2017-08-24 VITALS
BODY MASS INDEX: 35.31 KG/M2 | HEART RATE: 77 BPM | HEIGHT: 67 IN | DIASTOLIC BLOOD PRESSURE: 76 MMHG | WEIGHT: 225 LBS | RESPIRATION RATE: 14 BRPM | TEMPERATURE: 98.7 F | SYSTOLIC BLOOD PRESSURE: 151 MMHG | OXYGEN SATURATION: 98 %

## 2017-08-24 DIAGNOSIS — N20.1 CALCULUS OF URETER: ICD-10-CM

## 2017-08-24 PROCEDURE — 74450 X-RAY URETHRA/BLADDER: CPT

## 2017-08-24 PROCEDURE — C1769 GUIDE WIRE: HCPCS | Performed by: UROLOGY

## 2017-08-24 PROCEDURE — C2625 STENT, NON-COR, TEM W/DEL SY: HCPCS | Performed by: UROLOGY

## 2017-08-24 PROCEDURE — C1758 CATHETER, URETERAL: HCPCS | Performed by: UROLOGY

## 2017-08-24 DEVICE — STENT KWART RETRO INJECT SET 7FR 145CM: Type: IMPLANTABLE DEVICE | Site: URETER | Status: FUNCTIONAL

## 2017-08-24 RX ORDER — MIDAZOLAM HYDROCHLORIDE 1 MG/ML
INJECTION INTRAMUSCULAR; INTRAVENOUS AS NEEDED
Status: DISCONTINUED | OUTPATIENT
Start: 2017-08-24 | End: 2017-08-24 | Stop reason: SURG

## 2017-08-24 RX ORDER — MAGNESIUM HYDROXIDE 1200 MG/15ML
LIQUID ORAL AS NEEDED
Status: DISCONTINUED | OUTPATIENT
Start: 2017-08-24 | End: 2017-08-24 | Stop reason: HOSPADM

## 2017-08-24 RX ORDER — KETOROLAC TROMETHAMINE 30 MG/ML
INJECTION, SOLUTION INTRAMUSCULAR; INTRAVENOUS AS NEEDED
Status: DISCONTINUED | OUTPATIENT
Start: 2017-08-24 | End: 2017-08-24 | Stop reason: SURG

## 2017-08-24 RX ORDER — OXYCODONE HYDROCHLORIDE 5 MG/1
5 TABLET ORAL EVERY 4 HOURS PRN
Status: DISCONTINUED | OUTPATIENT
Start: 2017-08-24 | End: 2017-08-24 | Stop reason: HOSPADM

## 2017-08-24 RX ORDER — TRAMADOL HYDROCHLORIDE 50 MG/1
50 TABLET ORAL EVERY 6 HOURS PRN
COMMUNITY

## 2017-08-24 RX ORDER — METOCLOPRAMIDE HYDROCHLORIDE 5 MG/ML
INJECTION INTRAMUSCULAR; INTRAVENOUS AS NEEDED
Status: DISCONTINUED | OUTPATIENT
Start: 2017-08-24 | End: 2017-08-24 | Stop reason: SURG

## 2017-08-24 RX ORDER — MORPHINE SULFATE 2 MG/ML
2 INJECTION, SOLUTION INTRAMUSCULAR; INTRAVENOUS ONCE
Status: CANCELLED | OUTPATIENT
Start: 2017-08-24

## 2017-08-24 RX ORDER — PROPOFOL 10 MG/ML
INJECTION, EMULSION INTRAVENOUS AS NEEDED
Status: DISCONTINUED | OUTPATIENT
Start: 2017-08-24 | End: 2017-08-24 | Stop reason: SURG

## 2017-08-24 RX ORDER — ONDANSETRON 2 MG/ML
INJECTION INTRAMUSCULAR; INTRAVENOUS AS NEEDED
Status: DISCONTINUED | OUTPATIENT
Start: 2017-08-24 | End: 2017-08-24 | Stop reason: SURG

## 2017-08-24 RX ORDER — MORPHINE SULFATE 4 MG/ML
2 INJECTION, SOLUTION INTRAMUSCULAR; INTRAVENOUS
Status: DISCONTINUED | OUTPATIENT
Start: 2017-08-24 | End: 2017-08-24 | Stop reason: HOSPADM

## 2017-08-24 RX ORDER — ACETAMINOPHEN 325 MG/1
650 TABLET ORAL EVERY 6 HOURS PRN
Status: DISCONTINUED | OUTPATIENT
Start: 2017-08-24 | End: 2017-08-24 | Stop reason: HOSPADM

## 2017-08-24 RX ORDER — SODIUM CHLORIDE 9 MG/ML
125 INJECTION, SOLUTION INTRAVENOUS CONTINUOUS
Status: DISCONTINUED | OUTPATIENT
Start: 2017-08-24 | End: 2017-08-24 | Stop reason: HOSPADM

## 2017-08-24 RX ORDER — FENTANYL CITRATE 50 UG/ML
INJECTION, SOLUTION INTRAMUSCULAR; INTRAVENOUS AS NEEDED
Status: DISCONTINUED | OUTPATIENT
Start: 2017-08-24 | End: 2017-08-24 | Stop reason: SURG

## 2017-08-24 RX ORDER — DEXAMETHASONE SODIUM PHOSPHATE 4 MG/ML
INJECTION, SOLUTION INTRA-ARTICULAR; INTRALESIONAL; INTRAMUSCULAR; INTRAVENOUS; SOFT TISSUE AS NEEDED
Status: DISCONTINUED | OUTPATIENT
Start: 2017-08-24 | End: 2017-08-24 | Stop reason: SURG

## 2017-08-24 RX ORDER — CEPHALEXIN 500 MG/1
500 CAPSULE ORAL EVERY 12 HOURS SCHEDULED
Qty: 14 CAPSULE | Refills: 0 | Status: SHIPPED | OUTPATIENT
Start: 2017-08-24 | End: 2017-08-31

## 2017-08-24 RX ORDER — CEPHALEXIN 500 MG/1
500 CAPSULE ORAL EVERY 12 HOURS SCHEDULED
Status: ON HOLD | COMMUNITY
End: 2017-08-24

## 2017-08-24 RX ORDER — MORPHINE SULFATE 4 MG/ML
2 INJECTION, SOLUTION INTRAMUSCULAR; INTRAVENOUS ONCE
Status: DISCONTINUED | OUTPATIENT
Start: 2017-08-24 | End: 2017-08-24 | Stop reason: HOSPADM

## 2017-08-24 RX ORDER — OXYCODONE HYDROCHLORIDE 5 MG/1
TABLET ORAL
Qty: 20 TABLET | Refills: 0 | Status: SHIPPED | OUTPATIENT
Start: 2017-08-24

## 2017-08-24 RX ORDER — FENTANYL CITRATE/PF 50 MCG/ML
50 SYRINGE (ML) INJECTION
Status: COMPLETED | OUTPATIENT
Start: 2017-08-24 | End: 2017-08-24

## 2017-08-24 RX ORDER — ATORVASTATIN CALCIUM 10 MG/1
10 TABLET, FILM COATED ORAL DAILY
COMMUNITY

## 2017-08-24 RX ORDER — GLYCOPYRROLATE 0.2 MG/ML
INJECTION INTRAMUSCULAR; INTRAVENOUS AS NEEDED
Status: DISCONTINUED | OUTPATIENT
Start: 2017-08-24 | End: 2017-08-24 | Stop reason: SURG

## 2017-08-24 RX ORDER — ONDANSETRON 2 MG/ML
4 INJECTION INTRAMUSCULAR; INTRAVENOUS ONCE AS NEEDED
Status: DISCONTINUED | OUTPATIENT
Start: 2017-08-24 | End: 2017-08-24 | Stop reason: HOSPADM

## 2017-08-24 RX ORDER — ONDANSETRON 2 MG/ML
4 INJECTION INTRAMUSCULAR; INTRAVENOUS EVERY 6 HOURS PRN
Status: DISCONTINUED | OUTPATIENT
Start: 2017-08-24 | End: 2017-08-24 | Stop reason: HOSPADM

## 2017-08-24 RX ORDER — KETOROLAC TROMETHAMINE 30 MG/ML
15 INJECTION, SOLUTION INTRAMUSCULAR; INTRAVENOUS EVERY 6 HOURS SCHEDULED
Status: DISCONTINUED | OUTPATIENT
Start: 2017-08-24 | End: 2017-08-24 | Stop reason: HOSPADM

## 2017-08-24 RX ADMIN — FENTANYL CITRATE 25 MCG: 50 INJECTION, SOLUTION INTRAMUSCULAR; INTRAVENOUS at 11:13

## 2017-08-24 RX ADMIN — FENTANYL CITRATE 50 MCG: 50 INJECTION INTRAMUSCULAR; INTRAVENOUS at 13:27

## 2017-08-24 RX ADMIN — OXYCODONE HYDROCHLORIDE 5 MG: 5 TABLET ORAL at 14:47

## 2017-08-24 RX ADMIN — FENTANYL CITRATE 25 MCG: 50 INJECTION, SOLUTION INTRAMUSCULAR; INTRAVENOUS at 11:54

## 2017-08-24 RX ADMIN — FENTANYL CITRATE 25 MCG: 50 INJECTION, SOLUTION INTRAMUSCULAR; INTRAVENOUS at 12:14

## 2017-08-24 RX ADMIN — HYDROMORPHONE HYDROCHLORIDE 0.5 MG: 1 INJECTION, SOLUTION INTRAMUSCULAR; INTRAVENOUS; SUBCUTANEOUS at 13:48

## 2017-08-24 RX ADMIN — MIDAZOLAM HYDROCHLORIDE 2 MG: 1 INJECTION, SOLUTION INTRAMUSCULAR; INTRAVENOUS at 10:41

## 2017-08-24 RX ADMIN — SODIUM CHLORIDE 125 ML/HR: 0.9 INJECTION, SOLUTION INTRAVENOUS at 10:01

## 2017-08-24 RX ADMIN — FENTANYL CITRATE 25 MCG: 50 INJECTION, SOLUTION INTRAMUSCULAR; INTRAVENOUS at 11:52

## 2017-08-24 RX ADMIN — DEXAMETHASONE SODIUM PHOSPHATE 4 MG: 4 INJECTION, SOLUTION INTRAMUSCULAR; INTRAVENOUS at 11:00

## 2017-08-24 RX ADMIN — PROPOFOL 280 MG: 10 INJECTION, EMULSION INTRAVENOUS at 10:49

## 2017-08-24 RX ADMIN — FENTANYL CITRATE 25 MCG: 50 INJECTION, SOLUTION INTRAMUSCULAR; INTRAVENOUS at 11:08

## 2017-08-24 RX ADMIN — FENTANYL CITRATE 25 MCG: 50 INJECTION, SOLUTION INTRAMUSCULAR; INTRAVENOUS at 12:03

## 2017-08-24 RX ADMIN — CEFAZOLIN SODIUM 2000 MG: 2 SOLUTION INTRAVENOUS at 10:40

## 2017-08-24 RX ADMIN — SODIUM CHLORIDE: 0.9 INJECTION, SOLUTION INTRAVENOUS at 12:22

## 2017-08-24 RX ADMIN — HYDROMORPHONE HYDROCHLORIDE 0.5 MG: 1 INJECTION, SOLUTION INTRAMUSCULAR; INTRAVENOUS; SUBCUTANEOUS at 13:58

## 2017-08-24 RX ADMIN — ONDANSETRON HYDROCHLORIDE 4 MG: 2 INJECTION, SOLUTION INTRAVENOUS at 11:00

## 2017-08-24 RX ADMIN — GLYCOPYRROLATE 0.2 MG: 0.2 INJECTION, SOLUTION INTRAMUSCULAR; INTRAVENOUS at 10:41

## 2017-08-24 RX ADMIN — METRONIDAZOLE 250 MG: 500 INJECTION, SOLUTION INTRAVENOUS at 10:57

## 2017-08-24 RX ADMIN — FENTANYL CITRATE 100 MCG: 50 INJECTION, SOLUTION INTRAMUSCULAR; INTRAVENOUS at 09:23

## 2017-08-24 RX ADMIN — HYDROMORPHONE HYDROCHLORIDE 0.5 MG: 1 INJECTION, SOLUTION INTRAMUSCULAR; INTRAVENOUS; SUBCUTANEOUS at 13:38

## 2017-08-24 RX ADMIN — KETOROLAC TROMETHAMINE 30 MG: 30 INJECTION, SOLUTION INTRAMUSCULAR at 11:12

## 2017-08-24 RX ADMIN — LIDOCAINE HYDROCHLORIDE 40 MG: 20 INJECTION, SOLUTION INTRAVENOUS at 10:49

## 2017-08-24 RX ADMIN — FENTANYL CITRATE 25 MCG: 50 INJECTION, SOLUTION INTRAMUSCULAR; INTRAVENOUS at 10:55

## 2017-08-24 RX ADMIN — METOCLOPRAMIDE HYDROCHLORIDE 10 MG: 5 INJECTION INTRAMUSCULAR; INTRAVENOUS at 10:41

## 2017-08-24 RX ADMIN — SODIUM CHLORIDE 125 ML/HR: 0.9 INJECTION, SOLUTION INTRAVENOUS at 08:48

## 2017-08-24 RX ADMIN — FENTANYL CITRATE 25 MCG: 50 INJECTION, SOLUTION INTRAMUSCULAR; INTRAVENOUS at 11:10

## 2017-08-24 RX ADMIN — FENTANYL CITRATE 50 MCG: 50 INJECTION INTRAMUSCULAR; INTRAVENOUS at 13:22

## 2017-08-28 ENCOUNTER — GENERIC CONVERSION - ENCOUNTER (OUTPATIENT)
Dept: OTHER | Facility: OTHER | Age: 42
End: 2017-08-28

## 2017-09-11 ENCOUNTER — TRANSCRIBE ORDERS (OUTPATIENT)
Dept: ADMINISTRATIVE | Facility: HOSPITAL | Age: 42
End: 2017-09-11

## 2017-09-11 DIAGNOSIS — N20.0 URIC ACID NEPHROLITHIASIS: Primary | ICD-10-CM

## 2017-09-16 ENCOUNTER — HOSPITAL ENCOUNTER (OUTPATIENT)
Dept: CT IMAGING | Facility: HOSPITAL | Age: 42
Discharge: HOME | End: 2017-09-16
Attending: UROLOGY
Payer: COMMERCIAL

## 2017-09-16 ENCOUNTER — GENERIC CONVERSION - ENCOUNTER (OUTPATIENT)
Dept: OTHER | Facility: OTHER | Age: 42
End: 2017-09-16

## 2017-09-16 DIAGNOSIS — N20.0 URIC ACID NEPHROLITHIASIS: ICD-10-CM

## 2017-09-16 PROCEDURE — 74176 CT ABD & PELVIS W/O CONTRAST: CPT

## 2017-09-19 ENCOUNTER — GENERIC CONVERSION - ENCOUNTER (OUTPATIENT)
Dept: OTHER | Facility: OTHER | Age: 42
End: 2017-09-19

## 2017-12-23 ENCOUNTER — APPOINTMENT (OUTPATIENT)
Dept: LAB | Facility: HOSPITAL | Age: 42
End: 2017-12-23
Payer: COMMERCIAL

## 2017-12-23 ENCOUNTER — TRANSCRIBE ORDERS (OUTPATIENT)
Dept: ADMINISTRATIVE | Facility: HOSPITAL | Age: 42
End: 2017-12-23

## 2017-12-23 DIAGNOSIS — E78.2 MIXED HYPERLIPIDEMIA: Primary | ICD-10-CM

## 2017-12-23 DIAGNOSIS — E78.2 MIXED HYPERLIPIDEMIA: ICD-10-CM

## 2017-12-23 LAB
ALBUMIN SERPL BCP-MCNC: 3.8 G/DL (ref 3.5–5)
ALP SERPL-CCNC: 89 U/L (ref 46–116)
ALT SERPL W P-5'-P-CCNC: 42 U/L (ref 12–78)
AST SERPL W P-5'-P-CCNC: 15 U/L (ref 5–45)
BILIRUB DIRECT SERPL-MCNC: 0.12 MG/DL (ref 0–0.2)
BILIRUB SERPL-MCNC: 0.4 MG/DL (ref 0.2–1)
CHOLEST SERPL-MCNC: 248 MG/DL (ref 50–200)
HDLC SERPL-MCNC: 37 MG/DL (ref 40–60)
LDLC SERPL CALC-MCNC: 156 MG/DL (ref 0–100)
PROT SERPL-MCNC: 7.7 G/DL (ref 6.4–8.2)
TRIGL SERPL-MCNC: 274 MG/DL

## 2017-12-23 PROCEDURE — 80061 LIPID PANEL: CPT

## 2017-12-23 PROCEDURE — 36415 COLL VENOUS BLD VENIPUNCTURE: CPT

## 2017-12-23 PROCEDURE — 80076 HEPATIC FUNCTION PANEL: CPT

## 2018-01-10 NOTE — MISCELLANEOUS
Message   Recorded as Task   Date: 09/21/2017 03:44 PM, Created By: Corie Puritt   Task Name: Call Back   Assigned To: Ulises COOLEY,TEAM   Regarding Patient: Sirena Albrecht, Status: Active   CommentFredick Sherries - 21 Sep 2017 3:44 PM     TASK CREATED  Caller: Self; [phone] (Home)  Patients wife returning call from yesterday for CT Scan results  Also has several kidney stones in a container  What should they do with them    Please call to advise   Maegan Adame - 21 Sep 2017 3:50 PM     TASK EDITED  Pt notified of results and will bring stones with them for his f/u on the 09/29/17  Active Problems    1  Calculus, kidney (592 0) (N20 0)   2  History of urinary tract infection (V13 02) (Z87 440)    Current Meds   1  Cephalexin 500 MG Oral Capsule; TAKE 1 CAPSULE TWICE DAILY; Therapy: 02PPL5326 to (Evaluate:02Pqj1494)  Requested for: 11QWA0929; Last   Rx:75Qae5317 Ordered   2  OxyCODONE HCl - 5 MG Oral Tablet (Roxicodone); TAKE 1 TABLET EVERY 4 HOURS   AS NEEDED FOR PAIN;   Therapy: 28Nsd5812 to (Evaluate:55Tgc9652); Last Rx:40Hsc3716 Ordered   3  Tamsulosin HCl - 0 4 MG Oral Capsule; Therapy: (Albertine Plate) to Recorded   4  TraMADol HCl 50 MG TBDP; Therapy: (Recorded:59Wvw3662) to Recorded    Allergies    1   No Known Drug Allergies    Signatures   Electronically signed by : [place], ; Sep 21 2017  3:50PM EST                       (Author)

## 2018-01-10 NOTE — RESULT NOTES
Discussion/Summary   No stone on the left!, there are some small nonblocking right stones  Do metabolic w/u so we can see why he is forming stones  Verified Results  CT RENAL STONE STUDY ABDOMEN PELVIS WO CONTRAST 16Sep2017 10:46AM Brenda Duran     Test Name Result Flag Reference   CT RENAL STONE STUDY ABDOMEN PELVIS WO CONTRAST (Report)     CT ABDOMEN AND PELVIS WITHOUT IV CONTRAST - LOW DOSE RENAL STONE      INDICATION: N20 0: Calculus of kidney  History taken directly from the electronic ordering system  COMPARISON: CT abdomen/pelvis dated July 30, 2017  TECHNIQUE: Low dose thin section CT examination of the abdomen and pelvis was performed without intravenous or oral contrast according to a protocol specifically designed to evaluate for urinary tract calculus  Reformatted images were created in axial,   sagittal, and coronal planes  Evaluation for pathology in the abdomen and pelvis that is unrelated to urinary tract calculi is limited  Radiation dose length product (DLP) for this visit: 395 mGy-cm   This examination, like all CT scans performed in the [facility], was performed utilizing techniques to minimize radiation dose exposure, including the use of iterative    reconstruction and automated exposure control  FINDINGS:     RIGHT KIDNEY AND URETER:   There is a 1 to 2 mm intrarenal calculus at the upper pole; a 3 mm intrarenal calculus at the upper pole; a 2 mm intrarenal calculus at the midpole; a 4 mm intrarenal calculus at the lower pole  No hydronephrosis or hydroureter  No perinephric collection  LEFT KIDNEY AND URETER:   There is mild fullness of the left renal collecting system and left ureter with no evidence of a genitourinary tract calculus  The findings may be related to a recently passed stone   A previously described 10 mm obstructing calculus within the mid left   ureter on a prior CT abdomen/pelvis dated July 30, 2017 is no longer visualized  No perinephric collection  URINARY BLADDER:   Unremarkable  No significant abnormality in the visualized lung bases  Limited low radiation dose noncontrast CT evaluation demonstrates no clinically significant abnormality of liver, spleen, pancreas, or adrenal glands  No calcified gallstones or gallbladder wall thickening noted  No ascites or lymphadenopathy  There are small bilateral fat-containing inguinal hernias  There is a tiny fat-containing umbilical hernia  Limited evaluation demonstrates no evidence to suggest acute appendicitis  No acute fracture or destructive osseous lesion is identified  IMPRESSION:     Mild fullness of the left renal collecting system and ureter with no evidence of an obstructing ureteral calculus  The findings may be related to a recently passed stone  Multiple right-sided subcentimeter nonobstructing intrarenal calculi  No free air or free fluid         Workstation performed: [id number]     Signed by:   Rico Shelton MD   9/18/17

## 2018-01-11 NOTE — MISCELLANEOUS
Message   Recorded as Task   Date: 08/28/2017 09:08 AM, Created By: Yesi Germain   Task Name: Call Back   Assigned To: Ulises COOLEY,TEAM   Regarding Patient: Marissa Celis, Status: Active   Comment:    Samantha Arellano - 28 Aug 2017 9:08 AM     TASK CREATED  Patient had uscope procedure with Dr Abisai Wong 8/24/17  Per patients wife, patient is having alot of pain from the stent  Patients wife states they were in contact with the on call Dr  over the weekend and advised to come in today for stent removal  Please call [phone] to advise  Iqra Navarro - 28 Aug 2017 9:11 AM     TASK EDITED  Done  Active Problems    1  History of urinary tract infection (V13 02) (Z96 262)    Current Meds   1  Cephalexin 500 MG Oral Capsule; TAKE 1 CAPSULE TWICE DAILY; Therapy: 86GYE9579 to (Evaluate:98Bij4975)  Requested for: 02UDA6014; Last   Rx:07Rdu1069 Ordered   2  Tamsulosin HCl - 0 4 MG Oral Capsule; Therapy: (Recorded:03Vkn3720) to Recorded   3  TraMADol HCl 50 MG TBDP;    Therapy: (Deonte ) to Recorded    Signatures   Electronically signed by : Haylee Roberts RN; Aug 28 2017  9:11AM EST                       (Author)

## 2018-01-11 NOTE — RESULT NOTES
Verified Results  XR RETROGRADE PYELOGRAM [id number] 04:41AM Chelsea Eduardo     Test Name Result Flag Reference   XR RETROGRADE PYELOGRAM (Report)     LEFT RETROGRADE PYELOGRAM     INDICATION: Stone extraction stent placement  COMPARISON: CT July 30, 2017     IMAGES: 12     FLUOROSCOPY TIME:  2 min 2 sec ft     CONTRAST: 25 cc Omnipaque-240     PHYSICIAN: Dr Mercy Lundberg     FINDINGS:     Initial image demonstrates large calcification projected adjacent to the L3 transverse process which may correspond with the previously noted ureteric calculi  Subsequent images demonstrated opacification of the left collecting system which appeared to    be hydronephrotic  The ureter was dilated proximally  Distally ureter was not visualized  There is an subsequent placement of ureteric stent in the collecting system  Within the left pelvis on last image the rounded calcification is seen adjacent to    the ureteric stent     Osseous and soft tissue detail limited by technique  IMPRESSION:     Left-sided ureteric nephrolithiasis and stent placement Please see procedure report for further details         Workstation performed: [id number]     Signed by:   Jose Silverman MD   8/26/17

## 2018-01-12 NOTE — MISCELLANEOUS
Message     Recorded as Task   Date: 08/16/2017 08:59 AM, Created By: Rachel Rosa   Task Name: Call Back   Assigned To: Ulises COOLEY,TEAM   Regarding Patient: Cruz Quiñones, Status: Active   Comment:    Samantha Arellano - 16 Aug 2017 8:59 AM     TASK CREATED  Patient's wife has questions regarding a medication he is taking  Patient is scheduled for Cystoscopy, Left Uscope, Laser Stent with Dr Gregg Wise 8/24/17  Please call [phone]  Kelly Sweet - 16 Aug 2017 9:37 AM     TASK EDITED  Wife questioning if  should remain on Cephalexin prior to his surgery  UA results from 08 12 2017 reviewed by PIERRE Mathur PA-C  Per her order, pt  to start Cephalexin 500mg BID for three days beginning 08 21 2017  [facility] to pharmacy  Pt  to call prior if experiencing fever, chills, or urinary symptoms  Current Meds   1  Tamsulosin HCl - 0 4 MG Oral Capsule; Therapy: (Recorded:93Pif1291) to Recorded   2  TraMADol HCl 50 MG TBDP;    Therapy: (Navin Fitzgerald) to Recorded    Signatures   Electronically signed by : Roxana Schreiber, ; Aug 16 2017  9:38AM EST                       (Author)

## 2018-01-12 NOTE — MISCELLANEOUS
Message   Recorded as Task   Date: 08/28/2017 08:46 AM, Created By: Sonny Candelaria   Task Name: Medical Complaint Callback   Assigned To: Ulises COOLEY,TEAM   Regarding Patient: Irl Mortimer, Status: Active   CommentRenate Shad - 28 Aug 2017 8:46 AM     TASK CREATED  Caller: Self; [phone] (Home)  Pt's wife lmom pt was having severe stent pain over weekend was instructed to call office this morning to come in to have removed  Iqra Navarro - 28 Aug 2017 8:58 AM     TASK EDITED  Spoke to wife  Appt today at 11:15 with Dr Ranjana Menendez for possible stent removal         Active Problems    1  History of urinary tract infection (V13 02) (Z87 440)    Current Meds   1  Cephalexin 500 MG Oral Capsule; TAKE 1 CAPSULE TWICE DAILY; Therapy: 31CBN6368 to (Evaluate:40Pqe4287)  Requested for: 57ORH1985; Last   Rx:56Lex1613 Ordered   2  Tamsulosin HCl - 0 4 MG Oral Capsule; Therapy: (Recorded:53Jjg1116) to Recorded   3  TraMADol HCl 50 MG TBDP;    Therapy: (Jesús Herring) to Recorded    Signatures   Electronically signed by : Frannie Brito RN; Aug 28 2017  9:00AM EST                       (Author)

## 2018-01-17 NOTE — MISCELLANEOUS
Message  Return to work or school:   Rosalino Camilo is under my professional care  He was seen in my office on 8/7/2017   He is able to return to work on  8/7/2017    He can perform work without limitations  Weight Bearing Status: Partial Weight-Bearing  PATIENT MAY LIFT UP TO 75LBS     Ga Billingsley MD       Signatures   Electronically signed by : Chuck Trivedi, ; Aug  7 2017 12:13PM EST                       (Author)

## 2022-05-14 ENCOUNTER — HOSPITAL ENCOUNTER (EMERGENCY)
Facility: HOSPITAL | Age: 47
Discharge: HOME | End: 2022-05-14
Attending: EMERGENCY MEDICINE
Payer: COMMERCIAL

## 2022-05-14 ENCOUNTER — APPOINTMENT (EMERGENCY)
Dept: CT IMAGING | Facility: HOSPITAL | Age: 47
End: 2022-05-14
Payer: COMMERCIAL

## 2022-05-14 VITALS
SYSTOLIC BLOOD PRESSURE: 135 MMHG | TEMPERATURE: 98.4 F | HEART RATE: 90 BPM | OXYGEN SATURATION: 96 % | DIASTOLIC BLOOD PRESSURE: 71 MMHG | RESPIRATION RATE: 18 BRPM

## 2022-05-14 DIAGNOSIS — R10.9 FLANK PAIN: Primary | ICD-10-CM

## 2022-05-14 DIAGNOSIS — N20.0 RENAL CALCULI: ICD-10-CM

## 2022-05-14 LAB
ALBUMIN SERPL BCP-MCNC: 3.5 G/DL (ref 3.5–5)
ALP SERPL-CCNC: 90 U/L (ref 46–116)
ALT SERPL W P-5'-P-CCNC: 39 U/L (ref 12–78)
ANION GAP SERPL CALCULATED.3IONS-SCNC: 8 MMOL/L (ref 4–13)
AST SERPL W P-5'-P-CCNC: 17 U/L (ref 5–45)
BACTERIA UR QL AUTO: ABNORMAL /HPF
BASOPHILS # BLD AUTO: 0.03 THOUSANDS/ΜL (ref 0–0.1)
BASOPHILS NFR BLD AUTO: 0 % (ref 0–1)
BILIRUB SERPL-MCNC: 0.55 MG/DL (ref 0.2–1)
BILIRUB UR QL STRIP: NEGATIVE
BUN SERPL-MCNC: 21 MG/DL (ref 5–25)
CALCIUM SERPL-MCNC: 9 MG/DL (ref 8.3–10.1)
CHLORIDE SERPL-SCNC: 104 MMOL/L (ref 100–108)
CLARITY UR: CLEAR
CO2 SERPL-SCNC: 29 MMOL/L (ref 21–32)
COLOR UR: YELLOW
CREAT SERPL-MCNC: 1.14 MG/DL (ref 0.6–1.3)
EOSINOPHIL # BLD AUTO: 0.09 THOUSAND/ΜL (ref 0–0.61)
EOSINOPHIL NFR BLD AUTO: 1 % (ref 0–6)
ERYTHROCYTE [DISTWIDTH] IN BLOOD BY AUTOMATED COUNT: 12.2 % (ref 11.6–15.1)
GFR SERPL CREATININE-BSD FRML MDRD: 75 ML/MIN/1.73SQ M
GLUCOSE SERPL-MCNC: 97 MG/DL (ref 65–140)
GLUCOSE UR STRIP-MCNC: NEGATIVE MG/DL
HCT VFR BLD AUTO: 44.2 % (ref 36.5–49.3)
HGB BLD-MCNC: 15.5 G/DL (ref 12–17)
HGB UR QL STRIP.AUTO: ABNORMAL
IMM GRANULOCYTES # BLD AUTO: 0.02 THOUSAND/UL (ref 0–0.2)
IMM GRANULOCYTES NFR BLD AUTO: 0 % (ref 0–2)
KETONES UR STRIP-MCNC: NEGATIVE MG/DL
LEUKOCYTE ESTERASE UR QL STRIP: ABNORMAL
LYMPHOCYTES # BLD AUTO: 2.32 THOUSANDS/ΜL (ref 0.6–4.47)
LYMPHOCYTES NFR BLD AUTO: 26 % (ref 14–44)
MCH RBC QN AUTO: 32.1 PG (ref 26.8–34.3)
MCHC RBC AUTO-ENTMCNC: 35.1 G/DL (ref 31.4–37.4)
MCV RBC AUTO: 92 FL (ref 82–98)
MONOCYTES # BLD AUTO: 0.91 THOUSAND/ΜL (ref 0.17–1.22)
MONOCYTES NFR BLD AUTO: 10 % (ref 4–12)
NEUTROPHILS # BLD AUTO: 5.68 THOUSANDS/ΜL (ref 1.85–7.62)
NEUTS SEG NFR BLD AUTO: 63 % (ref 43–75)
NITRITE UR QL STRIP: NEGATIVE
NON-SQ EPI CELLS URNS QL MICRO: ABNORMAL /HPF
NRBC BLD AUTO-RTO: 0 /100 WBCS
PH UR STRIP.AUTO: 6 [PH]
PLATELET # BLD AUTO: 217 THOUSANDS/UL (ref 149–390)
PMV BLD AUTO: 9.8 FL (ref 8.9–12.7)
POTASSIUM SERPL-SCNC: 4.1 MMOL/L (ref 3.5–5.3)
PROT SERPL-MCNC: 7.6 G/DL (ref 6.4–8.2)
PROT UR STRIP-MCNC: ABNORMAL MG/DL
RBC # BLD AUTO: 4.83 MILLION/UL (ref 3.88–5.62)
RBC #/AREA URNS AUTO: ABNORMAL /HPF
SODIUM SERPL-SCNC: 141 MMOL/L (ref 136–145)
SP GR UR STRIP.AUTO: 1.02 (ref 1–1.03)
UROBILINOGEN UR QL STRIP.AUTO: 1 E.U./DL
WBC # BLD AUTO: 9.05 THOUSAND/UL (ref 4.31–10.16)
WBC #/AREA URNS AUTO: ABNORMAL /HPF

## 2022-05-14 PROCEDURE — 85025 COMPLETE CBC W/AUTO DIFF WBC: CPT | Performed by: SURGERY

## 2022-05-14 PROCEDURE — 74176 CT ABD & PELVIS W/O CONTRAST: CPT

## 2022-05-14 PROCEDURE — 96361 HYDRATE IV INFUSION ADD-ON: CPT

## 2022-05-14 PROCEDURE — 99284 EMERGENCY DEPT VISIT MOD MDM: CPT

## 2022-05-14 PROCEDURE — 81001 URINALYSIS AUTO W/SCOPE: CPT | Performed by: SURGERY

## 2022-05-14 PROCEDURE — 99285 EMERGENCY DEPT VISIT HI MDM: CPT | Performed by: SURGERY

## 2022-05-14 PROCEDURE — 80053 COMPREHEN METABOLIC PANEL: CPT | Performed by: SURGERY

## 2022-05-14 PROCEDURE — 96375 TX/PRO/DX INJ NEW DRUG ADDON: CPT

## 2022-05-14 PROCEDURE — 96374 THER/PROPH/DIAG INJ IV PUSH: CPT

## 2022-05-14 PROCEDURE — 36415 COLL VENOUS BLD VENIPUNCTURE: CPT | Performed by: SURGERY

## 2022-05-14 RX ORDER — MORPHINE SULFATE 4 MG/ML
4 INJECTION, SOLUTION INTRAMUSCULAR; INTRAVENOUS ONCE
Status: COMPLETED | OUTPATIENT
Start: 2022-05-14 | End: 2022-05-14

## 2022-05-14 RX ORDER — OXYCODONE HYDROCHLORIDE AND ACETAMINOPHEN 5; 325 MG/1; MG/1
1 TABLET ORAL ONCE
Status: COMPLETED | OUTPATIENT
Start: 2022-05-14 | End: 2022-05-14

## 2022-05-14 RX ORDER — KETOROLAC TROMETHAMINE 30 MG/ML
15 INJECTION, SOLUTION INTRAMUSCULAR; INTRAVENOUS ONCE
Status: COMPLETED | OUTPATIENT
Start: 2022-05-14 | End: 2022-05-14

## 2022-05-14 RX ORDER — OXYCODONE HYDROCHLORIDE AND ACETAMINOPHEN 5; 325 MG/1; MG/1
1 TABLET ORAL EVERY 4 HOURS PRN
Qty: 12 TABLET | Refills: 0 | Status: SHIPPED | OUTPATIENT
Start: 2022-05-14 | End: 2022-05-17

## 2022-05-14 RX ORDER — TAMSULOSIN HYDROCHLORIDE 0.4 MG/1
0.4 CAPSULE ORAL
Qty: 3 CAPSULE | Refills: 0 | Status: SHIPPED | OUTPATIENT
Start: 2022-05-14 | End: 2022-05-17

## 2022-05-14 RX ORDER — NAPROXEN 500 MG/1
500 TABLET ORAL 2 TIMES DAILY WITH MEALS
Qty: 10 TABLET | Refills: 0 | Status: SHIPPED | OUTPATIENT
Start: 2022-05-14 | End: 2022-05-19

## 2022-05-14 RX ADMIN — SODIUM CHLORIDE 1000 ML: 0.9 INJECTION, SOLUTION INTRAVENOUS at 04:17

## 2022-05-14 RX ADMIN — OXYCODONE HYDROCHLORIDE AND ACETAMINOPHEN 1 TABLET: 5; 325 TABLET ORAL at 05:23

## 2022-05-14 RX ADMIN — MORPHINE SULFATE 4 MG: 4 INJECTION INTRAVENOUS at 05:01

## 2022-05-14 RX ADMIN — KETOROLAC TROMETHAMINE 15 MG: 30 INJECTION, SOLUTION INTRAMUSCULAR at 04:18

## 2022-05-14 NOTE — DISCHARGE INSTRUCTIONS
Please return to the ED if you begin to experience any new or worsening symptoms, chest pain, shortness of breath, lightheadedness, dizziness, changes to vision, syncopal episodes, numbness, tingling, or weakness in the extremities, difficulty walking/swallowing/breathing  Please follow-up with Urology within the next few days  Referral placed

## 2022-05-14 NOTE — ED PROVIDER NOTES
History  Chief Complaint   Patient presents with    Flank Pain     Pt c/o sharp right sided flank pain worsening for 3 days, hx kidney stones     Moy Perez is a 48 y o  male with a pertinent past medical history of hyperlipidemia, renal calculi presenting today with right-sided flank pain  The patient reports that he has had right-sided flank pain for the past 3 days that has been getting progressively worse prompting the ED visit today  He rates it a 6/10 severity  He reports that the pain feels identical to previous episodes of renal calculi  The patient has had kidney stones in the past and had to have procedures to remove them  The patient denies any fevers, chills, vomiting, diarrhea  Associated nausea  Denies any dysuria, hematuria, hematochezia, melena, hematemesis  Patient has not taking anything for symptoms  No further complaints at this time  Prior to Admission Medications   Prescriptions Last Dose Informant Patient Reported?  Taking?   atorvastatin (LIPITOR) 10 mg tablet   Yes No   Sig: Take 10 mg by mouth daily   oxyCODONE (ROXICODONE) 5 mg immediate release tablet   No No   Sig: Take 1-2 tablets every 6 hours prn   oxybutynin (DITROPAN) 5 mg tablet   No No   Sig: Take 1 tablet by mouth 3 (three) times a day   Patient taking differently: Take 5 mg by mouth 2 (two) times a day     tamsulosin (FLOMAX) 0 4 mg   No No   Sig: Take 1 capsule by mouth daily with dinner   traMADol (ULTRAM) 50 mg tablet   Yes No   Sig: Take 50 mg by mouth every 6 (six) hours as needed for moderate pain      Facility-Administered Medications: None       Past Medical History:   Diagnosis Date    Hyperlipidemia        Past Surgical History:   Procedure Laterality Date    APPENDECTOMY      KIDNEY STONE SURGERY      HI CYSTO/URETERO W/LITHOTRIPSY &INDWELL STENT INSRT Left 8/24/2017    Procedure: CYSTOSCOPY URETEROSCOPY , RETROGRADE PYELOGRAM AND  STENT EXCHANGE URETERAL,ATTEMPTED STONE REMOVAL WITH BASKET;  Surgeon: Jose Chavis MD;  Location: [facility];  Service: Urology    VA CYSTOURETHROSCOPY,URETER CATHETER Left 7/31/2017    Procedure: CYSTOSCOPY RETROGRADE PYELOGRAM WITH INSERTION STENT URETERAL;  Surgeon: Jose Chavis MD;  Location: [facility];  Service: Urology       History reviewed  No pertinent family history  I have reviewed and agree with the history as documented  E-Cigarette/Vaping     E-Cigarette/Vaping Substances     Social History     Tobacco Use    Smoking status: Current Every Day Smoker     Types: Cigars   Substance Use Topics    Alcohol use: Yes     Comment: social    Drug use: No       Review of Systems   Constitutional: Negative for activity change, chills, diaphoresis and fever  HENT: Negative for congestion, ear discharge, ear pain, rhinorrhea, sore throat and trouble swallowing  Eyes: Negative for pain, discharge, redness and visual disturbance  Respiratory: Negative for cough, chest tightness, shortness of breath and wheezing  Cardiovascular: Negative for chest pain, palpitations and leg swelling  Gastrointestinal: Negative for abdominal distention, abdominal pain, blood in stool, constipation, diarrhea, nausea and vomiting  Genitourinary: Positive for flank pain  Negative for difficulty urinating, dysuria, frequency, hematuria and urgency  Musculoskeletal: Negative for arthralgias, myalgias, neck pain and neck stiffness  Skin: Negative for color change and rash  Neurological: Negative for dizziness, syncope, facial asymmetry, weakness, light-headedness, numbness and headaches  Physical Exam  Physical Exam  Vitals reviewed  Constitutional:       General: He is not in acute distress  Appearance: Normal appearance  He is not ill-appearing  HENT:      Head: Normocephalic and atraumatic  Right Ear: Tympanic membrane normal       Left Ear: Tympanic membrane normal       Nose: Nose normal  No congestion or rhinorrhea  Mouth/Throat:      Mouth: Mucous membranes are moist       Pharynx: Oropharynx is clear  No oropharyngeal exudate or posterior oropharyngeal erythema  Eyes:      Extraocular Movements: Extraocular movements intact  Conjunctiva/sclera: Conjunctivae normal       Pupils: Pupils are equal, round, and reactive to light  Cardiovascular:      Rate and Rhythm: Normal rate and regular rhythm  Pulses: Normal pulses  Heart sounds: Normal heart sounds  Pulmonary:      Effort: Pulmonary effort is normal  No respiratory distress  Breath sounds: Normal breath sounds  No wheezing  Abdominal:      General: Abdomen is flat  Bowel sounds are normal  There is no distension  Palpations: Abdomen is soft  There is no mass  Tenderness: There is no abdominal tenderness  There is right CVA tenderness  There is no left CVA tenderness or guarding  Hernia: No hernia is present  Musculoskeletal:         General: No swelling, tenderness or deformity  Normal range of motion  Cervical back: Normal range of motion and neck supple  No rigidity or tenderness  Right lower leg: No edema  Left lower leg: No edema  Skin:     General: Skin is warm and dry  Capillary Refill: Capillary refill takes less than 2 seconds  Coloration: Skin is not jaundiced  Findings: No erythema or rash  Neurological:      General: No focal deficit present  Mental Status: He is alert and oriented to person, place, and time  Cranial Nerves: No cranial nerve deficit  Motor: No weakness        Gait: Gait normal          Vital Signs  ED Triage Vitals [05/14/22 0357]   Temperature Pulse Respirations Blood Pressure SpO2   98 4 °F (36 9 °C) 83 20 160/84 98 %      Temp Source Heart Rate Source Patient Position - Orthostatic VS BP Location FiO2 (%)   Oral Monitor Lying Right arm --      Pain Score       10 - Worst Possible Pain           Vitals:    05/14/22 0357 05/14/22 0515   BP: 160/84 135/71 Pulse: 83 90   Patient Position - Orthostatic VS: Lying Lying         Visual Acuity      ED Medications  Medications   sodium chloride 0 9 % bolus 1,000 mL (0 mL Intravenous Stopped 5/14/22 0523)   ketorolac (TORADOL) injection 15 mg (15 mg Intravenous Given 5/14/22 0418)   morphine (PF) 4 mg/mL injection 4 mg (4 mg Intravenous Given 5/14/22 0501)   oxyCODONE-acetaminophen (PERCOCET) 5-325 mg per tablet 1 tablet (1 tablet Oral Given 5/14/22 0523)       Diagnostic Studies  Results Reviewed     Procedure Component Value Units Date/Time    Urine Microscopic [[id number]]  (Abnormal) Collected: 05/14/22 0503    Lab Status: Final result Specimen: Urine, Clean Catch Updated: 05/14/22 0519     RBC, UA Innumerable /hpf      WBC, UA 4-10 /hpf      Epithelial Cells None Seen /hpf      Bacteria, UA None Seen /hpf     UA w Reflex to Microscopic w Reflex to Culture [[id number]]  (Abnormal) Collected: 05/14/22 0503    Lab Status: Final result Specimen: Urine, Clean Catch Updated: 05/14/22 0510     Color, UA Yellow     Clarity, UA Clear     Specific [place], UA 1 020     pH, UA 6 0     Leukocytes, UA Large     Nitrite, UA Negative     Protein, UA Trace mg/dl      Glucose, UA Negative mg/dl      Ketones, UA Negative mg/dl      Urobilinogen, UA 1 0 E U /dl      Bilirubin, UA Negative     Blood, UA Moderate    Comprehensive metabolic panel [[id number]] Collected: 05/14/22 0417    Lab Status: Final result Specimen: Blood from Arm, Left Updated: 05/14/22 0440     Sodium 141 mmol/L      Potassium 4 1 mmol/L      Chloride 104 mmol/L      CO2 29 mmol/L      ANION GAP 8 mmol/L      BUN 21 mg/dL      Creatinine 1 14 mg/dL      Glucose 97 mg/dL      Calcium 9 0 mg/dL      AST 17 U/L      ALT 39 U/L      Alkaline Phosphatase 90 U/L      Total Protein 7 6 g/dL      Albumin 3 5 g/dL      Total Bilirubin 0 55 mg/dL      eGFR 75 ml/min/1 73sq m     Narrative:      [place] guidelines for Chronic Kidney Disease (CKD):    Stage 1 with normal or high GFR (GFR > 90 mL/min/1 73 square meters)    Stage 2 Mild CKD (GFR = 60-89 mL/min/1 73 square meters)    Stage 3A Moderate CKD (GFR = 45-59 mL/min/1 73 square meters)    Stage 3B Moderate CKD (GFR = 30-44 mL/min/1 73 square meters)    Stage 4 Severe CKD (GFR = 15-29 mL/min/1 73 square meters)    Stage 5 End Stage CKD (GFR <15 mL/min/1 73 square meters)  Note: GFR calculation is accurate only with a steady state creatinine    CBC and differential [[id number]] Collected: 05/14/22 0417    Lab Status: Final result Specimen: Blood from Arm, Left Updated: 05/14/22 0426     WBC 9 05 Thousand/uL      RBC 4 83 Million/uL      Hemoglobin 15 5 g/dL      Hematocrit 44 2 %      MCV 92 fL      MCH 32 1 pg      MCHC 35 1 g/dL      RDW 12 2 %      MPV 9 8 fL      Platelets 312 Thousands/uL      nRBC 0 /100 WBCs      Neutrophils Relative 63 %      Immat GRANS % 0 %      Lymphocytes Relative 26 %      Monocytes Relative 10 %      Eosinophils Relative 1 %      Basophils Relative 0 %      Neutrophils Absolute 5 68 Thousands/µL      Immature Grans Absolute 0 02 Thousand/uL      Lymphocytes Absolute 2 32 Thousands/µL      Monocytes Absolute 0 91 Thousand/µL      Eosinophils Absolute 0 09 Thousand/µL      Basophils Absolute 0 03 Thousands/µL                  CT abdomen pelvis wo contrast   Final Result by Man Perea MD (05/14 0448)      Right hydronephrosis secondary to a 7 x 5 x 4 mm stone at the mid right ureter at the level of L3 vertebral body  Nonobstructing bilateral nephrolithiasis            Workstation performed: [id number]                    Procedures  Procedures         ED Course                               SBIRT 20yo+    Flowsheet Row Most Recent Value   SBIRT (25 yo +)    In order to provide better care to our patients, we are screening all of our patients for alcohol and drug use  Would it be okay to ask you these screening questions?  Yes Filed at: 05/14/2022 0406   Initial Alcohol Screen: US AUDIT-C     1  How often do you have a drink containing alcohol? 1 Filed at: 05/14/2022 0406   2  How many drinks containing alcohol do you have on a typical day you are drinking? 1 Filed at: 05/14/2022 0406   3a  Male UNDER 65: How often do you have five or more drinks on one occasion? 1 Filed at: 05/14/2022 0406   Audit-C Score 3 Filed at: 05/14/2022 0406   KATH: How many times in the past year have you    Used an illegal drug or used a prescription medication for non-medical reasons? Never Filed at: 05/14/2022 0406                    MDM  Number of Diagnoses or Management Options  Flank pain: established and worsening  Renal calculi: established and worsening  Diagnosis management comments: Patient with a pertinent history of renal calculi  He remains afebrile, not tachycardic, not meeting sepsis criteria  The patient does not have a leukocytosis  His pain is well controlled here in the ER with pain management  The patient has a history of renal calculi, and reports that his symptoms today feel similar  I will discharge with close follow-up with Urology, and pain management  Strict return criteria were discussed with the patient not limited to fever, chills, worsening pain, new or worsening symptoms  All questions answered appropriately  The patient demonstrated understanding         Amount and/or Complexity of Data Reviewed  Clinical lab tests: ordered and reviewed  Tests in the radiology section of CPT®: ordered and reviewed  Review and summarize past medical records: yes  Discuss the patient with other providers: yes  Independent visualization of images, tracings, or specimens: yes    Risk of Complications, Morbidity, and/or Mortality  Presenting problems: moderate  Diagnostic procedures: moderate  Management options: moderate    Patient Progress  Patient progress: improved      Disposition  Final diagnoses:   Flank pain   Renal calculi     Time reflects when diagnosis was documented in both MDM as applicable and the Disposition within this note     Time User Action Codes Description Comment    5/14/2022  4:58 AM Araseli Cardona Add [R10 9] Flank pain     5/14/2022  4:58 AM Araseli Cardona Add [N20 0] Renal calculi       ED Disposition     ED Disposition   Discharge    Condition   Stable    Date/Time   Sat May 14, 2022  4:59 AM    Comment   Dae Gonzalez discharge to home/self care  Follow-up Information     Follow up With Specialties Details Why Contact Info Additional [address] [facility], MD [facility] Medicine   [address] [place] [address] Michelle [phone]  [address] [facility] Emergency Department Emergency Medicine Go to  If symptoms worsen [address] [facility] [address] [facility] Emergency Department, [address] [place], [place], [address] For Urology [facility] Urology Schedule an appointment as soon as possible for a visit in 1 week  [address] [facility] [phone]  [address]  [facility] For Urology [facility], [address], [address], [facility], [place], [address]          Discharge Medication List as of 5/14/2022  5:02 AM      START taking these medications    Details   naproxen (Naprosyn) 500 mg tablet Take 1 tablet (500 mg total) by mouth in the morning and 1 tablet (500 mg total) in the evening  Take with meals  Do all this for 5 days  , Starting Sat 5/14/2022, Until u 5/19/2022, Print      oxyCODONE-acetaminophen (Percocet) 5-325 mg per tablet Take 1 tablet by mouth every 4 (four) hours as needed for moderate pain or severe pain for up to 3 days Max Daily Amount: 6 tablets, Starting Sat 5/14/2022, Until Tue 5/17/2022 at 2359, Normal         CONTINUE these medications which have NOT CHANGED    Details   atorvastatin (LIPITOR) 10 mg tablet Take 10 mg by mouth daily, Historical Med      oxybutynin (DITROPAN) 5 mg tablet Take 1 tablet by mouth 3 (three) times a day, Starting Thu 8/3/2017, Print      oxyCODONE (ROXICODONE) 5 mg immediate release tablet Take 1-2 tablets every 6 hours prn, Print      tamsulosin (FLOMAX) 0 4 mg Take 1 capsule by mouth daily with dinner, Starting Thu 8/3/2017, Print      traMADol (ULTRAM) 50 mg tablet Take 50 mg by mouth every 6 (six) hours as needed for moderate pain, Historical Med                 PDMP Review     None          ED Provider  Electronically Signed by           Randall Lucero PA-C  05/14/22 3329

## 2022-05-16 ENCOUNTER — TELEPHONE (OUTPATIENT)
Dept: UROLOGY | Facility: AMBULATORY SURGERY CENTER | Age: 47
End: 2022-05-16

## 2022-05-16 NOTE — TELEPHONE ENCOUNTER
New patient contacrd at [phone]    A new patient emrgency room follow up appointment has been made for pt to be seen 5/27/22    Pt wants to be seen [facility] only  Pts wife stating pt is is a lot of pain and is running out of his flomax  Pt is currently without a pcp  Pts wife stating they will be going back to the emergency room today  Pt needs to get back to work asap  He is a

## 2022-05-16 NOTE — TELEPHONE ENCOUNTER
Please Triage  New Patient    What is the reason for the patients appointment? Pt was in the ED and it was found that he has a 7mm kidney stone  He has pain that is 10 out of 10 and he has chills and nausea    What office location does the patient prefer? [place]     Imaging/Lab Results: in epic     Do we accept the patient's insurance or is the patient Self-Pay? Insurance Provider: Willie Jang  Plan Type/Number:  Member ID#: Has the patient had any previous Urologist(s)? In 2017 for kidney stones Dr Angle Johnson    Have patient records been requested? If not are records showing in Epic: epic     Has the patient had any outside testing done? [facility]    Does the patient have a personal history of cancer?  No     Patient wife, Jasmin Ruiz at [phone]

## 2022-05-16 NOTE — TELEPHONE ENCOUNTER
Patient's wife calling back  She is requesting to speak to someone as soon as possible because her  is in a lot of pain       Patient's wife requesting a call back [phone]

## 2023-11-29 ENCOUNTER — OFFICE VISIT (OUTPATIENT)
Dept: OBGYN CLINIC | Facility: CLINIC | Age: 48
End: 2023-11-29
Payer: COMMERCIAL

## 2023-11-29 VITALS
HEIGHT: 67 IN | HEART RATE: 71 BPM | DIASTOLIC BLOOD PRESSURE: 80 MMHG | SYSTOLIC BLOOD PRESSURE: 130 MMHG | BODY MASS INDEX: 35.31 KG/M2 | WEIGHT: 225 LBS

## 2023-11-29 DIAGNOSIS — M23.91 INTERNAL DERANGEMENT OF RIGHT KNEE: Primary | ICD-10-CM

## 2023-11-29 PROCEDURE — 99203 OFFICE O/P NEW LOW 30 MIN: CPT | Performed by: FAMILY MEDICINE

## 2023-11-29 RX ORDER — CEPHALEXIN 500 MG/1
1 CAPSULE ORAL 2 TIMES DAILY
COMMUNITY
Start: 2017-08-16

## 2023-11-29 RX ORDER — MELOXICAM 15 MG/1
15 TABLET ORAL DAILY
COMMUNITY
Start: 2023-10-25 | End: 2024-10-24
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
## 2023-12-04 ENCOUNTER — HOSPITAL ENCOUNTER (EMERGENCY)
Facility: HOSPITAL | Age: 48
Discharge: HOME | End: 2023-12-04
Attending: EMERGENCY MEDICINE
Payer: OTHER MISCELLANEOUS

## 2023-12-04 ENCOUNTER — PATIENT MESSAGE (OUTPATIENT)
Dept: OBGYN CLINIC | Facility: CLINIC | Age: 48
End: 2023-12-04

## 2023-12-04 VITALS
OXYGEN SATURATION: 99 % | DIASTOLIC BLOOD PRESSURE: 92 MMHG | BODY MASS INDEX: 35.24 KG/M2 | SYSTOLIC BLOOD PRESSURE: 164 MMHG | RESPIRATION RATE: 22 BRPM | TEMPERATURE: 97.9 F | WEIGHT: 225 LBS | HEART RATE: 97 BPM

## 2023-12-04 DIAGNOSIS — M25.561 CHRONIC PAIN OF RIGHT KNEE: ICD-10-CM

## 2023-12-04 DIAGNOSIS — S86.111A STRAIN OF RIGHT GASTROCNEMIUS MUSCLE, INITIAL ENCOUNTER: Primary | ICD-10-CM

## 2023-12-04 DIAGNOSIS — G89.29 CHRONIC PAIN OF RIGHT KNEE: ICD-10-CM

## 2023-12-04 PROCEDURE — 99284 EMERGENCY DEPT VISIT MOD MDM: CPT | Performed by: EMERGENCY MEDICINE

## 2023-12-04 PROCEDURE — 99283 EMERGENCY DEPT VISIT LOW MDM: CPT

## 2023-12-04 RX ORDER — IBUPROFEN 600 MG/1
600 TABLET ORAL ONCE
Status: COMPLETED | OUTPATIENT
Start: 2023-12-04 | End: 2023-12-04

## 2023-12-04 RX ORDER — ACETAMINOPHEN 325 MG/1
650 TABLET ORAL EVERY 6 HOURS PRN
Qty: 40 TABLET | Refills: 0 | Status: SHIPPED | OUTPATIENT
Start: 2023-12-04

## 2023-12-04 RX ORDER — ACETAMINOPHEN 325 MG/1
975 TABLET ORAL ONCE
Status: COMPLETED | OUTPATIENT
Start: 2023-12-04 | End: 2023-12-04

## 2023-12-04 RX ORDER — IBUPROFEN 600 MG/1
600 TABLET ORAL EVERY 8 HOURS PRN
Qty: 20 TABLET | Refills: 0 | Status: SHIPPED | OUTPATIENT
Start: 2023-12-04

## 2023-12-04 RX ADMIN — ACETAMINOPHEN 975 MG: 325 TABLET, FILM COATED ORAL at 14:06

## 2023-12-04 RX ADMIN — IBUPROFEN 600 MG: 600 TABLET, FILM COATED ORAL at 14:06

## 2023-12-04 NOTE — ED NOTES
Knee immobilizer placed on patient at this time, patient educated on putting it on as well as crutches use.       Aldair Fish RN  12/04/23 5366

## 2023-12-04 NOTE — ED PROVIDER NOTES
Pt Name: Neftali Medrano  MRN: [id number]  [address] 1975  Age/Sex: 46 y.o. male  Date of evaluation: 12/4/2023  PCP: Iris Ortiz MD    CHIEF COMPLAINT    Chief Complaint   Patient presents with    Knee Pain     Pt reports that he just hurt his right knee at work , believes he turned the wrong way          HPI    46 y.o. male presenting with right calf pain. Patient states that he has been having ongoing issues with his right knee, with frequent pain and swelling, is followed by a sports medicine Dr. for same and has an MRI scheduled. He states that he was having more significant pain over the last 2 to 3 weeks, but yesterday had been doing better over the past few days, with some mild swelling of the knee noted. He states that he was at work, was lifting something and twisted, thinks he turned the wrong way and had a sudden sharp pain in the medial aspect of his right calf. He notes significant tenderness to the area, states that it hurts when he walks, the pain is currently dull, severe, worse with moving the leg or pressing on the area or walking and better at rest.  He denies numbness, weakness, trauma, fevers, other symptoms. HPI      Past Medical and Surgical History    Past Medical History:   Diagnosis Date    Hyperlipidemia        Past Surgical History:   Procedure Laterality Date    APPENDECTOMY      KIDNEY STONE SURGERY      RI CYSTO BLADDER W/URETERAL CATHETERIZATION Left 7/31/2017    Procedure: CYSTOSCOPY RETROGRADE PYELOGRAM WITH INSERTION STENT URETERAL;  Surgeon: Brigette Chan MD;  Location: [facility];  Service: Urology    RI CYSTO/URETERO W/LITHOTRIPSY &INDWELL STENT INSRT Left 8/24/2017    Procedure: CYSTOSCOPY URETEROSCOPY , RETROGRADE PYELOGRAM AND  STENT EXCHANGE [facility] STONE REMOVAL WITH BASKET;  Surgeon: Brigette Chan MD;  Location: [facility];  Service: Urology       History reviewed. No pertinent family history.     Social History     Tobacco Use Smoking status: Every Day     Types: Cigars   Vaping Use    Vaping Use: Every day    Substances: Nicotine   Substance Use Topics    Alcohol use: Yes     Comment: social    Drug use: No           Allergies    Allergies   Allergen Reactions    Lactose - Food Allergy GI Intolerance       Home Medications    Prior to Admission medications    Medication Sig Start Date End Date Taking? Authorizing Provider   atorvastatin (LIPITOR) 10 mg tablet Take 10 mg by mouth daily    Historical Provider, MD   cephalexin (KEFLEX) 500 mg capsule Take 1 capsule by mouth 2 (two) times a day 8/16/17   Historical Provider, MD   meloxicam (MOBIC) 15 mg tablet Take 15 mg by mouth daily 10/25/23 10/24/24  Historical Provider, MD   oxybutynin (DITROPAN) 5 mg tablet Take 1 tablet by mouth 3 (three) times a day  Patient taking differently: Take 5 mg by mouth 2 (two) times a day 8/3/17   Kat Waldrop MD   oxyCODONE (ROXICODONE) 5 mg immediate release tablet Take 1-2 tablets every 6 hours prn 8/24/17   Brigette Chan MD   tamsulosin [facility]) 0.4 mg Take 1 capsule by mouth daily with dinner 8/3/17   Kat Waldrop MD   traMADol (ULTRAM) 50 mg tablet Take 50 mg by mouth every 6 (six) hours as needed for moderate pain    Historical Provider, MD           Review of Systems    Review of Systems   Constitutional:  Negative for appetite change, chills and diaphoresis. HENT:  Negative for drooling, facial swelling, trouble swallowing and voice change. Respiratory:  Negative for apnea, shortness of breath and wheezing. Cardiovascular:  Negative for chest pain and leg swelling. Gastrointestinal:  Negative for abdominal distention, abdominal pain, diarrhea, nausea and vomiting. Genitourinary:  Negative for dysuria and urgency. Musculoskeletal:  Positive for gait problem, joint swelling and myalgias. Negative for arthralgias, back pain and neck pain. Skin:  Negative for color change, rash and wound.    Neurological:  Negative for seizures, speech difficulty, weakness and headaches. Psychiatric/Behavioral:  Negative for agitation, behavioral problems and dysphoric mood. The patient is not nervous/anxious. All other systems reviewed and negative. Physical Exam      ED Triage Vitals [12/04/23 1237]   Temperature Pulse Respirations Blood Pressure SpO2   97.9 °F (36.6 °C) 97 22 164/92 99 %      Temp Source Heart Rate Source Patient Position - Orthostatic VS BP Location FiO2 (%)   Temporal Monitor Sitting Left arm --      Pain Score       --               Physical Exam  Vitals and nursing note reviewed. Constitutional:       General: He is not in acute distress. Appearance: He is well-developed. He is not ill-appearing, toxic-appearing or diaphoretic. HENT:      Head: Normocephalic and atraumatic. Right Ear: External ear normal.      Left Ear: External ear normal.      Nose: Nose normal. No congestion or rhinorrhea. Mouth/Throat:      Mouth: Mucous membranes are moist.      Pharynx: Oropharynx is clear. No oropharyngeal exudate or posterior oropharyngeal erythema. Eyes:      Conjunctiva/sclera: Conjunctivae normal.      Pupils: Pupils are equal, round, and reactive to light. Neck:      Trachea: No tracheal deviation. Cardiovascular:      Rate and Rhythm: Normal rate and regular rhythm. Pulses: Normal pulses. Pulmonary:      Effort: Pulmonary effort is normal. No respiratory distress. Breath sounds: No stridor. Abdominal:      General: There is no distension. Palpations: Abdomen is soft. Tenderness: There is no abdominal tenderness. There is no guarding or rebound. Musculoskeletal:         General: Tenderness present. No deformity. Normal range of motion. Cervical back: Normal range of motion and neck supple.       Comments: Mild effusion of the right knee noted, no erythema rash or other skin findings, no pain with axial loading, no instability or pain with varus or valgus stress or with anterior drawer. Patient is tender to palpation to the medial aspect of the joint line. Primary area of pain is in the medial aspect of the right calf overlying the gastrocnemius or perhaps the soleus. No hematoma noted, strength sensation pulse and cap refill intact distal, no pain out of proportion, compartments soft. Pain is reproduced with dorsiflexion of the foot and stretch of the gastrocnemius. Corcoran test is normal   Skin:     General: Skin is warm and dry. Capillary Refill: Capillary refill takes less than 2 seconds. Findings: No rash. Neurological:      Mental Status: He is alert and oriented to person, place, and time. Cranial Nerves: No cranial nerve deficit. Motor: No weakness. Coordination: Coordination normal.   Psychiatric:         Behavior: Behavior normal.         Thought Content: Thought content normal.         Judgment: Judgment normal.              Diagnostic Results      Labs:    Results Reviewed       None            All labs reviewed and utilized in the medical decision making process    Radiology:    No orders to display       All radiology studies independently viewed by me and interpreted by the radiologist.    Procedure    Procedures        ED Course of Care and Re-Assessments      Patient given crutches and knee immobilizer at his request after discussion of risks and benefits of immobilization, offered other medication for pain but he declined, states that he will continue to use Tylenol and ibuprofen. Medications - No data to display        FINAL IMPRESSION    Final diagnoses:   Strain of right gastrocnemius muscle, initial encounter   Chronic pain of right knee         DISPOSITION/PLAN    Presentation as above with acute on chronic right knee pain as well as acute pain of the right lower leg felt most consistent with strain of the right gastrocnemius muscle, possibly potentiated by instability of the knee secondary to effusion.   Low suspicion for complete tear of the gastroc based on patient being functionally intact on my examination as well as without significant bruising or other skin changes or palpable hematoma. No tenderness over the Achilles, normal Corcoran test, do not suspect Achilles tendon rupture. Very low suspicion for unstable fracture dislocation, critical neurovascular disruption, DVT, septic arthritis, compartment syndrome, other acute threat to life or limb. Treated symptomatically, counseled regarding conservative therapy at home, discharged with strict return precautions, follow-up with his orthopedic/sports medicine provider with whom he already follows. Time reflects when diagnosis was documented in both MDM as applicable and the Disposition within this note       Time User Action Codes Description Comment    12/4/2023  1:31 PM Yovani Fleming Add [J89.035O] Strain of right gastrocnemius muscle, initial encounter     12/4/2023  1:31 PM Yovani Fleming Add [A10.688,  G89.29] Chronic pain of right knee           ED Disposition       ED Disposition   Discharge    Condition   Stable    Date/Time   Mon Dec 4, 2023  1:30 PM    Comment   Natalia Mireles discharge to home/self care.                    Follow-up Information       Follow up With Specialties Details Why Contact Info Additional [facility] Emergency Department Emergency Medicine Go to  If symptoms worsen [address] [facility] Emergency Department, [address] [place], [place], [address]    Brandt Mcdonald MD Family Medicine Call  As needed [address] [facility] [place] [phone]       GUILLERMO [facility] Medicine Call today To discuss this visit and schedule close follow-up [place] [id number] [phone]                 PATIENT REFERRED TO:    [facility] Emergency Department  [facility] [address] [phone]  Go to   If symptoms worsen    Danny Ge MD  [address] [place] [address]  [phone]    Call   As needed    [facility]  [address]  [place] [address] [phone]    Call today  To discuss this visit and schedule close follow-up      DISCHARGE MEDICATIONS:    Patient's Medications   Discharge Prescriptions    No medications on file                Ulysses Dole, MD    Portions of the record may have been created with voice recognition software. Occasional wrong word or "sound alike" substitutions may have occurred due to the inherent limitations of voice recognition software.   Please read the chart carefully and recognize, using context, where substitutions have occurred     Ulysses Dole, MD  12/04/23 0767

## 2023-12-04 NOTE — Clinical Note
Kiran Merritt was seen and treated in our emergency department on 12/4/2023. Diagnosis:     Sal Safe  . He may return on this date: 12/08/2023         If you have any questions or concerns, please don't hesitate to call.       Christopher Gaspar RN    ______________________________           _______________          _______________  Hospital Representative                              Date                                Time

## 2023-12-08 ENCOUNTER — HOSPITAL ENCOUNTER (OUTPATIENT)
Dept: MRI IMAGING | Facility: HOSPITAL | Age: 48
End: 2023-12-08
Attending: FAMILY MEDICINE
Payer: COMMERCIAL

## 2023-12-08 DIAGNOSIS — M23.91 INTERNAL DERANGEMENT OF RIGHT KNEE: ICD-10-CM

## 2023-12-08 PROCEDURE — 73721 MRI JNT OF LWR EXTRE W/O DYE: CPT

## 2023-12-08 PROCEDURE — G1004 CDSM NDSC: HCPCS

## 2023-12-14 ENCOUNTER — OFFICE VISIT (OUTPATIENT)
Dept: OBGYN CLINIC | Facility: CLINIC | Age: 48
End: 2023-12-14
Payer: OTHER MISCELLANEOUS

## 2023-12-14 VITALS
HEART RATE: 83 BPM | WEIGHT: 225 LBS | BODY MASS INDEX: 35.31 KG/M2 | SYSTOLIC BLOOD PRESSURE: 138 MMHG | DIASTOLIC BLOOD PRESSURE: 96 MMHG | HEIGHT: 67 IN

## 2023-12-14 DIAGNOSIS — S83.231D COMPLEX TEAR OF MEDIAL MENISCUS OF RIGHT KNEE AS CURRENT INJURY, SUBSEQUENT ENCOUNTER: Primary | ICD-10-CM

## 2023-12-14 DIAGNOSIS — S83.411D SPRAIN OF MEDIAL COLLATERAL LIGAMENT OF RIGHT KNEE, SUBSEQUENT ENCOUNTER: ICD-10-CM

## 2023-12-14 PROCEDURE — 99213 OFFICE O/P EST LOW 20 MIN: CPT | Performed by: FAMILY MEDICINE

## 2023-12-14 RX ORDER — IBUPROFEN 800 MG/1
800 TABLET ORAL 3 TIMES DAILY PRN
Qty: 60 TABLET | Refills: 0 | Status: SHIPPED | OUTPATIENT
Start: 2023-12-14

## 2023-12-14 RX ORDER — ATORVASTATIN CALCIUM 40 MG/1
TABLET, FILM COATED ORAL
COMMUNITY
Start: 2023-12-05

## 2023-12-14 NOTE — PROGRESS NOTES
Assessment/Plan:  Assessment/Plan   Diagnoses and all orders for this visit:    Complex tear of medial meniscus of right knee as current injury, subsequent encounter  -     Ambulatory Referral to Orthopedic Surgery  -     Ambulatory Referral to Orthopedic Surgery; Future  -     Ambulatory Referral to Orthopedic Surgery; Future  -     ibuprofen (MOTRIN) 800 mg tablet; Take 1 tablet (800 mg total) by mouth 3 (three) times a day as needed for moderate pain    Sprain of medial collateral ligament of right knee, subsequent encounter    Other orders  -     atorvastatin (LIPITOR) 40 mg tablet      42-year-old male with worsening of right knee pain from injury at work approximately 10/18/2023. Discussed with patient MRI findings, impression, and plan. MRI of the right knee noted for complex tear of the posterior horn and body medial meniscus with horizontal and radial components flipped fragment. There is mild tricompartmental degenerative changes without full-thickness cartilage loss. Grade 1 sprain MCL. Clinical impression is that he has symptoms from internal derangement. I advised patient that due to complexity of his injury I recommend he be seen and evaluated by orthopedic surgeon. He may take ibuprofen 800 mg 3 times daily with food as needed. He will follow-up with me as needed. Subjective:   Patient ID: Margie Belle is a 46 y.o. male. Chief Complaint   Patient presents with    Right Knee - Follow-up        42-year-old male following for worsening right knee pain from injury at work approximately 10/18/2023. He was last seen by me 2.5 weeks ago at which point he was referred for MRI of the right knee due to concern for internal derangement. Patient states that although he has been dealing with right knee pain from an injury 3 years ago, his symptoms were mild and he was able to work as a  without his symptoms inhibiting his ability to work.   He states approximately 10/18/2023 he was pulling on a pallet at work when he felt sudden worsening of his knee. He tried to continue working however symptoms worsened and he was then he was seen by orthopedics initially 10/25/2023. They advised him symptoms are likely due to to arthritis, provided him knee brace, and advised on Tylenol. He attempted to continue working however symptoms worsened and he was seen by me for another opinion, and I was concerned about internal derangement. Knee Pain  This is a new problem. The current episode started more than 1 month ago. The problem occurs daily. The problem has been gradually worsening. Associated symptoms include arthralgias and joint swelling. Pertinent negatives include no numbness or weakness. The symptoms are aggravated by standing, walking, twisting and bending. He has tried rest, position changes, NSAIDs and acetaminophen for the symptoms. The treatment provided mild relief. Review of Systems   Musculoskeletal:  Positive for arthralgias and joint swelling. Neurological:  Negative for weakness and numbness. Objective:  Vitals:    12/14/23 0810   BP: 138/96   Pulse: 83   Weight: 102 kg (225 lb)   Height: 5' 7" (1.702 m)      Ortho Exam    Physical Exam  Vitals and nursing note reviewed. Constitutional:       Appearance: Normal appearance. He is well-developed. He is not ill-appearing or diaphoretic. HENT:      Head: Normocephalic and atraumatic. Right Ear: External ear normal.      Left Ear: External ear normal.   Eyes:      Conjunctiva/sclera: Conjunctivae normal.   Neck:      Trachea: No tracheal deviation. Cardiovascular:      Rate and Rhythm: Normal rate. Pulmonary:      Effort: Pulmonary effort is normal. No respiratory distress. Abdominal:      General: There is no distension. Skin:     General: Skin is warm and dry. Coloration: Skin is not jaundiced or pale.    Neurological:      Mental Status: He is alert and oriented to person, place, and time. Psychiatric:         Mood and Affect: Mood normal.         Behavior: Behavior normal.         Thought Content: Thought content normal.         Judgment: Judgment normal.         I have personally reviewed pertinent films in PACS and my interpretation is  . Posterior horn medial meniscus tear without flipped fragment.

## 2023-12-14 NOTE — LETTER
December 14, 2023     Patient: Navin Canchola  YOB: 1975  Date of Visit: 12/14/2023      To Whom it May Concern:    Navin Canchola is under my professional care. Cassi Denney was seen in my office on 12/14/2023. Cassi Denney is referred to orthopedic surgeon due to complex nature of his right knee injury. If you have any questions or concerns, please don't hesitate to call.          Sincerely,          Connie Agarwal DO        CC: No Recipients

## 2023-12-21 ENCOUNTER — OFFICE VISIT (OUTPATIENT)
Dept: LAB | Facility: HOSPITAL | Age: 48
End: 2023-12-21
Payer: COMMERCIAL

## 2023-12-21 ENCOUNTER — HOSPITAL ENCOUNTER (OUTPATIENT)
Dept: RADIOLOGY | Facility: HOSPITAL | Age: 48
End: 2023-12-21
Payer: COMMERCIAL

## 2023-12-21 ENCOUNTER — OFFICE VISIT (OUTPATIENT)
Dept: OBGYN CLINIC | Facility: CLINIC | Age: 48
End: 2023-12-21
Payer: OTHER MISCELLANEOUS

## 2023-12-21 ENCOUNTER — APPOINTMENT (OUTPATIENT)
Dept: LAB | Facility: HOSPITAL | Age: 48
End: 2023-12-21
Payer: COMMERCIAL

## 2023-12-21 ENCOUNTER — TELEPHONE (OUTPATIENT)
Dept: OBGYN CLINIC | Facility: CLINIC | Age: 48
End: 2023-12-21

## 2023-12-21 VITALS
HEIGHT: 67 IN | SYSTOLIC BLOOD PRESSURE: 138 MMHG | WEIGHT: 225 LBS | HEART RATE: 85 BPM | DIASTOLIC BLOOD PRESSURE: 91 MMHG | BODY MASS INDEX: 35.31 KG/M2

## 2023-12-21 DIAGNOSIS — S83.231D COMPLEX TEAR OF MEDIAL MENISCUS OF RIGHT KNEE AS CURRENT INJURY, SUBSEQUENT ENCOUNTER: Primary | ICD-10-CM

## 2023-12-21 DIAGNOSIS — S83.231D COMPLEX TEAR OF MEDIAL MENISCUS OF RIGHT KNEE AS CURRENT INJURY, SUBSEQUENT ENCOUNTER: ICD-10-CM

## 2023-12-21 DIAGNOSIS — M25.561 ACUTE PAIN OF RIGHT KNEE: ICD-10-CM

## 2023-12-21 LAB
ALBUMIN SERPL BCP-MCNC: 4.7 G/DL (ref 3.5–5)
ALP SERPL-CCNC: 100 U/L (ref 34–104)
ALT SERPL W P-5'-P-CCNC: 57 U/L (ref 7–52)
ANION GAP SERPL CALCULATED.3IONS-SCNC: 6 MMOL/L
AST SERPL W P-5'-P-CCNC: 24 U/L (ref 13–39)
BASOPHILS # BLD AUTO: 0.03 THOUSANDS/ÂΜL (ref 0–0.1)
BASOPHILS NFR BLD AUTO: 0 % (ref 0–1)
BILIRUB SERPL-MCNC: 0.59 MG/DL (ref 0.2–1)
BUN SERPL-MCNC: 16 MG/DL (ref 5–25)
CALCIUM SERPL-MCNC: 10.1 MG/DL (ref 8.4–10.2)
CHLORIDE SERPL-SCNC: 102 MMOL/L (ref 96–108)
CO2 SERPL-SCNC: 30 MMOL/L (ref 21–32)
CREAT SERPL-MCNC: 0.91 MG/DL (ref 0.6–1.3)
EOSINOPHIL # BLD AUTO: 0.07 THOUSAND/ÂΜL (ref 0–0.61)
EOSINOPHIL NFR BLD AUTO: 1 % (ref 0–6)
ERYTHROCYTE [DISTWIDTH] IN BLOOD BY AUTOMATED COUNT: 11.9 % (ref 11.6–15.1)
GFR SERPL CREATININE-BSD FRML MDRD: 97 ML/MIN/1.73SQ M
GLUCOSE P FAST SERPL-MCNC: 95 MG/DL (ref 65–99)
HCT VFR BLD AUTO: 50.6 % (ref 36.5–49.3)
HGB BLD-MCNC: 17.8 G/DL (ref 12–17)
IMM GRANULOCYTES # BLD AUTO: 0.03 THOUSAND/UL (ref 0–0.2)
IMM GRANULOCYTES NFR BLD AUTO: 0 % (ref 0–2)
LYMPHOCYTES # BLD AUTO: 2.05 THOUSANDS/ÂΜL (ref 0.6–4.47)
LYMPHOCYTES NFR BLD AUTO: 23 % (ref 14–44)
MCH RBC QN AUTO: 31.7 PG (ref 26.8–34.3)
MCHC RBC AUTO-ENTMCNC: 35.2 G/DL (ref 31.4–37.4)
MCV RBC AUTO: 90 FL (ref 82–98)
MONOCYTES # BLD AUTO: 0.66 THOUSAND/ÂΜL (ref 0.17–1.22)
MONOCYTES NFR BLD AUTO: 8 % (ref 4–12)
NEUTROPHILS # BLD AUTO: 5.98 THOUSANDS/ÂΜL (ref 1.85–7.62)
NEUTS SEG NFR BLD AUTO: 68 % (ref 43–75)
NRBC BLD AUTO-RTO: 0 /100 WBCS
PLATELET # BLD AUTO: 252 THOUSANDS/UL (ref 149–390)
PMV BLD AUTO: 9.8 FL (ref 8.9–12.7)
POTASSIUM SERPL-SCNC: 4.4 MMOL/L (ref 3.5–5.3)
PROT SERPL-MCNC: 8.4 G/DL (ref 6.4–8.4)
RBC # BLD AUTO: 5.62 MILLION/UL (ref 3.88–5.62)
SODIUM SERPL-SCNC: 138 MMOL/L (ref 135–147)
WBC # BLD AUTO: 8.82 THOUSAND/UL (ref 4.31–10.16)

## 2023-12-21 PROCEDURE — 93005 ELECTROCARDIOGRAM TRACING: CPT

## 2023-12-21 PROCEDURE — 85025 COMPLETE CBC W/AUTO DIFF WBC: CPT

## 2023-12-21 PROCEDURE — 99214 OFFICE O/P EST MOD 30 MIN: CPT | Performed by: ORTHOPAEDIC SURGERY

## 2023-12-21 PROCEDURE — 80053 COMPREHEN METABOLIC PANEL: CPT

## 2023-12-21 PROCEDURE — 71046 X-RAY EXAM CHEST 2 VIEWS: CPT

## 2023-12-21 PROCEDURE — 36415 COLL VENOUS BLD VENIPUNCTURE: CPT

## 2023-12-21 RX ORDER — CHLORHEXIDINE GLUCONATE 4 G/100ML
SOLUTION TOPICAL DAILY PRN
OUTPATIENT
Start: 2023-12-21

## 2023-12-21 NOTE — H&P (VIEW-ONLY)
Patient Name:  Neil Kirk  MRN:  [id number]    Assessment & Plan     1. Complex tear of medial meniscus of right knee as current injury, subsequent encounter  -     Ambulatory Referral to Orthopedic Surgery  -     Ambulatory Referral to Orthopedic Surgery  -     Case request operating room: ARTHROSCOPY KNEE- Right Knee partial medial menisectomy vs meniscus repair; Standing  -     Comprehensive metabolic panel; Future  -     CBC and differential; Future  -     EKG 12 lead; Future  -     XR chest pa & lateral; Future; Expected date: 12/21/2023  -     Ambulatory referral to Family Practice; Future  -     Case request operating room: ARTHROSCOPY KNEE- Right Knee partial medial menisectomy vs meniscus repair    2. Acute pain of right knee        48 y.o. male with Right knee medial mensicus tear  X-rays and MRI reviewed in office today with patient.   I reviewed and discussed the patient's right knee MRI displaying posterior horn medial meniscus tear.  I discussed the patient's diagnosis and associated treatment options including nonsurgical and surgical treatment.  Nonoperative management would include formal physical therapy and physician directed home exercise program, activity modification, oral analgesics, and possible injection therapy.  Due to the patient's symptoms of pain and locking episodes, I also discussed surgical intervention in the form of right knee arthroscopy with partial medial meniscectomy versus repair.  Risks of surgery, including but not limited to, anesthesia complications, infection, damage to nerves and blood vessels, blood clots, postoperative stiffness, recurrent meniscal tearing, failure of meniscus to heal if repair is performed, posttraumatic arthritis, residual pain, need for subsequent surgery, and anesthesia complications were discussed at length.  Benefits of surgery include improved pain, decrease risk of tear propagation, and improved mechanical symptoms.  The patient  understands the risks and benefits and has no further questions.  The patient has elected to proceed forward with a right knee arthroscopy with partial medial meniscectomy versus repair, tentatively scheduled for January 5, 2024.  I will see him on the day of surgery.     Chief Complaint     Right knee pain    History of the Present Illness     Neil Kirk is a 48 y.o. male with Right knee pain. The patient states that he experienced an injury while at work. 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 He states that he was moving a pallet-[place] with industrial metal toward the end of the truck, as he was pulling the equipment he twisted and fell to the ground. He states that he experienced significant pain at the time of injury. However, it resolved shortly after and he continued working. The patient states that the pain returned approximately 1-2 days later. He reports significant sharp pain in his knee, that causes him to fall. He reports pain in the medial and posterior aspect of his knee and into his calf. He reports locking and catching. He states that the locking has occurred twice, lasting approximately 5-10 minutes. He states that the last incident of locking happened 3-4 days ago.  Previous treatments included NSAID medications, short term physical therapy, and home exercise program.     Review of Systems     Review of Systems   Constitutional:  Negative for chills and fever.   HENT:  Negative for ear pain and sore throat.    Eyes:  Negative for pain and visual disturbance.   Respiratory:  Negative for cough and shortness of breath.    Cardiovascular:  Negative for chest pain and palpitations.   Gastrointestinal:  Negative for abdominal pain and vomiting.   Genitourinary:  Negative for dysuria and hematuria.   Musculoskeletal:  Negative for arthralgias and back pain.   Skin:  Negative for color change and rash.   Neurological:  Negative  "for seizures and syncope.   All other systems reviewed and are negative.      Physical Exam     /91   Pulse 85   Ht 5' 7\" (1.702 m)   Wt 102 kg (225 lb)   BMI 35.24 kg/m²     RightKnee  Range of motion from 0 to 120. Pain with terminal extension and flexion.     There is no crepitus with range of motion.   There is no effusion.    There is tenderness over the medial joint line.    There is 5/5 quadriceps strength and good muscle tone.    The patient is able to perform a straight leg raise.      negative patellar grind test.  Anterior drawer tests is negative  negative Lachman Test.   Posterior drawer test is   negative   Erica's testing is positive    The patient is neurovascular intact distally.      Eyes:  Anicteric sclerae.  Neck:  Supple.  Lungs:  Normal respiratory effort.  Cardiovascular:  Capillary refill is less than 2 seconds.  Skin:  Intact without erythema.  Neurologic:  Sensation grossly intact to light touch.  Psychiatric:  Mood and affect are appropriate.    Data Review     I have personally reviewed pertinent films in PACS, and my interpretation follows:    MRI taken 12/8/23 of Right knee independently reviewed and demonstrate   1 complex tear of posterior horn body medial meniscus with grade 1 MCL sprain.  Mild degenerative changes noted.    X-rays taken 10/25/23 of Rightknee independently reviewed and demonstrate mild osteoarthritis, evidenced by joint space narrowing in the medial tibiofemoral joint.       Past Medical History:   Diagnosis Date    Hyperlipidemia        Past Surgical History:   Procedure Laterality Date    APPENDECTOMY      KIDNEY STONE SURGERY      AZ CYSTO BLADDER W/URETERAL CATHETERIZATION Left 7/31/2017    Procedure: CYSTOSCOPY RETROGRADE PYELOGRAM WITH INSERTION STENT URETERAL;  Surgeon: Jaydon Sandoval MD;  Location: [facility] OR;  Service: Urology    AZ CYSTO/URETERO W/LITHOTRIPSY &INDWELL STENT INSRT Left 8/24/2017    Procedure: CYSTOSCOPY URETEROSCOPY , " RETROGRADE PYELOGRAM AND  STENT EXCHANGE URETERAL,ATTEMPTED STONE REMOVAL WITH BASKET;  Surgeon: Jaydon Sandoval MD;  Location: [facility];  Service: Urology       Allergies   Allergen Reactions    Lactose - Food Allergy GI Intolerance       Current Outpatient Medications on File Prior to Visit   Medication Sig Dispense Refill    acetaminophen (TYLENOL) 325 mg tablet Take 2 tablets (650 mg total) by mouth every 6 (six) hours as needed for mild pain 40 tablet 0    atorvastatin (LIPITOR) 40 mg tablet       ibuprofen (MOTRIN) 800 mg tablet Take 1 tablet (800 mg total) by mouth 3 (three) times a day as needed for moderate pain 60 tablet 0    meloxicam (MOBIC) 15 mg tablet Take 15 mg by mouth daily      cephalexin (KEFLEX) 500 mg capsule Take 1 capsule by mouth 2 (two) times a day      ibuprofen (MOTRIN) 600 mg tablet Take 1 tablet (600 mg total) by mouth every 8 (eight) hours as needed for moderate pain 20 tablet 0    oxybutynin (DITROPAN) 5 mg tablet Take 1 tablet by mouth 3 (three) times a day (Patient taking differently: Take 5 mg by mouth 2 (two) times a day) 90 tablet 0    oxyCODONE (ROXICODONE) 5 mg immediate release tablet Take 1-2 tablets every 6 hours prn 20 tablet 0    tamsulosin (FLOMAX) 0.4 mg Take 1 capsule by mouth daily with dinner 30 capsule 0    traMADol (ULTRAM) 50 mg tablet Take 50 mg by mouth every 6 (six) hours as needed for moderate pain       No current facility-administered medications on file prior to visit.       Social History     Tobacco Use    Smoking status: Every Day     Types: Cigars   Vaping Use    Vaping status: Every Day    Substances: Nicotine   Substance Use Topics    Alcohol use: Yes     Comment: social    Drug use: No       History reviewed. No pertinent family history.        Procedures Performed     Procedures  None performed.       Scribe Attestation      I,:  Collin Wills am acting as a scribe while in the presence of the attending physician.:       I,:  Abner  DO Sherita personally performed the services described in this documentation    as scribed in my presence.:

## 2023-12-21 NOTE — PROGRESS NOTES
Patient Name:  Neil Kirk  MRN:  [id number]    Assessment & Plan     1. Complex tear of medial meniscus of right knee as current injury, subsequent encounter  -     Ambulatory Referral to Orthopedic Surgery  -     Ambulatory Referral to Orthopedic Surgery  -     Case request operating room: ARTHROSCOPY KNEE- Right Knee partial medial menisectomy vs meniscus repair; Standing  -     Comprehensive metabolic panel; Future  -     CBC and differential; Future  -     EKG 12 lead; Future  -     XR chest pa & lateral; Future; Expected date: 12/21/2023  -     Ambulatory referral to Family Practice; Future  -     Case request operating room: ARTHROSCOPY KNEE- Right Knee partial medial menisectomy vs meniscus repair    2. Acute pain of right knee        48 y.o. male with Right knee medial mensicus tear  X-rays and MRI reviewed in office today with patient.   I reviewed and discussed the patient's right knee MRI displaying posterior horn medial meniscus tear.  I discussed the patient's diagnosis and associated treatment options including nonsurgical and surgical treatment.  Nonoperative management would include formal physical therapy and physician directed home exercise program, activity modification, oral analgesics, and possible injection therapy.  Due to the patient's symptoms of pain and locking episodes, I also discussed surgical intervention in the form of right knee arthroscopy with partial medial meniscectomy versus repair.  Risks of surgery, including but not limited to, anesthesia complications, infection, damage to nerves and blood vessels, blood clots, postoperative stiffness, recurrent meniscal tearing, failure of meniscus to heal if repair is performed, posttraumatic arthritis, residual pain, need for subsequent surgery, and anesthesia complications were discussed at length.  Benefits of surgery include improved pain, decrease risk of tear propagation, and improved mechanical symptoms.  The patient  understands the risks and benefits and has no further questions.  The patient has elected to proceed forward with a right knee arthroscopy with partial medial meniscectomy versus repair, tentatively scheduled for January 5, 2024.  I will see him on the day of surgery.     Chief Complaint     Right knee pain    History of the Present Illness     Neil Kirk is a 48 y.o. male with Right knee pain. The patient states that he experienced an injury while at work. 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 He states that he was moving a pallet-[place] with industrial metal toward the end of the truck, as he was pulling the equipment he twisted and fell to the ground. He states that he experienced significant pain at the time of injury. However, it resolved shortly after and he continued working. The patient states that the pain returned approximately 1-2 days later. He reports significant sharp pain in his knee, that causes him to fall. He reports pain in the medial and posterior aspect of his knee and into his calf. He reports locking and catching. He states that the locking has occurred twice, lasting approximately 5-10 minutes. He states that the last incident of locking happened 3-4 days ago.  Previous treatments included NSAID medications, short term physical therapy, and home exercise program.     Review of Systems     Review of Systems   Constitutional:  Negative for chills and fever.   HENT:  Negative for ear pain and sore throat.    Eyes:  Negative for pain and visual disturbance.   Respiratory:  Negative for cough and shortness of breath.    Cardiovascular:  Negative for chest pain and palpitations.   Gastrointestinal:  Negative for abdominal pain and vomiting.   Genitourinary:  Negative for dysuria and hematuria.   Musculoskeletal:  Negative for arthralgias and back pain.   Skin:  Negative for color change and rash.   Neurological:  Negative  "for seizures and syncope.   All other systems reviewed and are negative.      Physical Exam     /91   Pulse 85   Ht 5' 7\" (1.702 m)   Wt 102 kg (225 lb)   BMI 35.24 kg/m²     RightKnee  Range of motion from 0 to 120. Pain with terminal extension and flexion.     There is no crepitus with range of motion.   There is no effusion.    There is tenderness over the medial joint line.    There is 5/5 quadriceps strength and good muscle tone.    The patient is able to perform a straight leg raise.      negative patellar grind test.  Anterior drawer tests is negative  negative Lachman Test.   Posterior drawer test is   negative   Erica's testing is positive    The patient is neurovascular intact distally.      Eyes:  Anicteric sclerae.  Neck:  Supple.  Lungs:  Normal respiratory effort.  Cardiovascular:  Capillary refill is less than 2 seconds.  Skin:  Intact without erythema.  Neurologic:  Sensation grossly intact to light touch.  Psychiatric:  Mood and affect are appropriate.    Data Review     I have personally reviewed pertinent films in PACS, and my interpretation follows:    MRI taken 12/8/23 of Right knee independently reviewed and demonstrate   1 complex tear of posterior horn body medial meniscus with grade 1 MCL sprain.  Mild degenerative changes noted.    X-rays taken 10/25/23 of Rightknee independently reviewed and demonstrate mild osteoarthritis, evidenced by joint space narrowing in the medial tibiofemoral joint.       Past Medical History:   Diagnosis Date    Hyperlipidemia        Past Surgical History:   Procedure Laterality Date    APPENDECTOMY      KIDNEY STONE SURGERY      CT CYSTO BLADDER W/URETERAL CATHETERIZATION Left 7/31/2017    Procedure: CYSTOSCOPY RETROGRADE PYELOGRAM WITH INSERTION STENT URETERAL;  Surgeon: Jaydon Sandoval MD;  Location: [facility] OR;  Service: Urology    CT CYSTO/URETERO W/LITHOTRIPSY &INDWELL STENT INSRT Left 8/24/2017    Procedure: CYSTOSCOPY URETEROSCOPY , " RETROGRADE PYELOGRAM AND  STENT EXCHANGE URETERAL,ATTEMPTED STONE REMOVAL WITH BASKET;  Surgeon: Jaydon Sandoval MD;  Location: [facility];  Service: Urology       Allergies   Allergen Reactions    Lactose - Food Allergy GI Intolerance       Current Outpatient Medications on File Prior to Visit   Medication Sig Dispense Refill    acetaminophen (TYLENOL) 325 mg tablet Take 2 tablets (650 mg total) by mouth every 6 (six) hours as needed for mild pain 40 tablet 0    atorvastatin (LIPITOR) 40 mg tablet       ibuprofen (MOTRIN) 800 mg tablet Take 1 tablet (800 mg total) by mouth 3 (three) times a day as needed for moderate pain 60 tablet 0    meloxicam (MOBIC) 15 mg tablet Take 15 mg by mouth daily      cephalexin (KEFLEX) 500 mg capsule Take 1 capsule by mouth 2 (two) times a day      ibuprofen (MOTRIN) 600 mg tablet Take 1 tablet (600 mg total) by mouth every 8 (eight) hours as needed for moderate pain 20 tablet 0    oxybutynin (DITROPAN) 5 mg tablet Take 1 tablet by mouth 3 (three) times a day (Patient taking differently: Take 5 mg by mouth 2 (two) times a day) 90 tablet 0    oxyCODONE (ROXICODONE) 5 mg immediate release tablet Take 1-2 tablets every 6 hours prn 20 tablet 0    tamsulosin (FLOMAX) 0.4 mg Take 1 capsule by mouth daily with dinner 30 capsule 0    traMADol (ULTRAM) 50 mg tablet Take 50 mg by mouth every 6 (six) hours as needed for moderate pain       No current facility-administered medications on file prior to visit.       Social History     Tobacco Use    Smoking status: Every Day     Types: Cigars   Vaping Use    Vaping status: Every Day    Substances: Nicotine   Substance Use Topics    Alcohol use: Yes     Comment: social    Drug use: No       History reviewed. No pertinent family history.        Procedures Performed     Procedures  None performed.       Scribe Attestation      I,:  Collin Wills am acting as a scribe while in the presence of the attending physician.:       I,:  Abner  DO Sherita personally performed the services described in this documentation    as scribed in my presence.:

## 2023-12-21 NOTE — TELEPHONE ENCOUNTER
Called the patient to advise him that his surgery date will be 1/5/24 at [facility].  I also gave him his postop appt 1/15/24 at 3:00.  The pt will go for his clearance with Dr. Quiroga on 12/26/25.  He did have his preop testing done today.

## 2023-12-22 PROBLEM — N20.1 CALCULUS OF URETER: Status: ACTIVE | Noted: 2023-02-22

## 2023-12-22 PROBLEM — E66.9 OBESITY (BMI 30-39.9): Status: ACTIVE | Noted: 2021-03-11

## 2023-12-22 PROBLEM — E55.9 VITAMIN D DEFICIENCY: Status: ACTIVE | Noted: 2020-12-17

## 2023-12-22 PROBLEM — S82.872K: Status: ACTIVE | Noted: 2020-08-23

## 2023-12-22 PROBLEM — Z87.81 STATUS POST ORIF OF FRACTURE OF ANKLE: Status: ACTIVE | Noted: 2021-01-30

## 2023-12-22 PROBLEM — N30.00 ACUTE CYSTITIS WITHOUT HEMATURIA: Status: ACTIVE | Noted: 2023-02-22

## 2023-12-22 PROBLEM — Z98.890 STATUS POST ORIF OF FRACTURE OF ANKLE: Status: ACTIVE | Noted: 2021-01-30

## 2023-12-22 PROBLEM — N30.00 ACUTE CYSTITIS WITHOUT HEMATURIA: Status: RESOLVED | Noted: 2023-02-22 | Resolved: 2023-12-22

## 2023-12-22 PROBLEM — S83.206A TEAR OF MENISCUS OF RIGHT KNEE AS CURRENT INJURY: Status: ACTIVE | Noted: 2023-12-22

## 2023-12-22 LAB
ATRIAL RATE: 70 BPM
P AXIS: 8 DEGREES
PR INTERVAL: 172 MS
QRS AXIS: -15 DEGREES
QRSD INTERVAL: 78 MS
QT INTERVAL: 374 MS
QTC INTERVAL: 403 MS
T WAVE AXIS: 20 DEGREES
VENTRICULAR RATE: 70 BPM

## 2023-12-22 NOTE — PROGRESS NOTES
Internal Medicine Pre-Operative Evaluation:     Reason for Visit: Pre-operative Evaluation for Risk Stratification and Optimization    Patient ID: Neil Kirk is a 48 y.o. male.     Surgery: Mensical repair of right knee  Referring Provider: Sherita Sal      Recommendations to Proceed withSurgery    Patient is considered to be Low risk for Medium risk procedure.     After evaluation and discussion with patient with emphasis that all surgery has some degree of inherent risk it is determined this procedure is of acceptable risk  medically.    Patient may proceed with planned procedure      Assessment    Pre-operative Medical Evaluation for planned surgery  Recommendations as listed in PLAN section below  Contact surgical nurse  navigator with any questions regarding preoperative plan or schedule.      Problem List Items Addressed This Visit          Unprioritized    HLD (hyperlipidemia)       Low cholesterol diet  Continue statin therapy           Tobacco use     Recommend cessation         Obesity (BMI 30-39.9)     Recommend ongoing attempts at weight loss  Current BMI meets criteria of <40 per MLJ preoperative qualifications           Tear of meniscus of right knee as current injury     For elective surgery  Pain controlled          Other Visit Diagnoses       Preoperative clearance    -  Primary                 Plan:     1. Further preoperative workup as follows:   - none no further testing required may proceed with surgery    2. Medication Management/Recommendations:   - hold aspirin 7 days prior to surgery  - avoid use of NSAID such as motrin, advil, aleve for 7 days prior to surgery  - hold all OTC herbal or nutritional supplements 7 days before surgery    3. Patient requires further consultation with:   No Consults Required    4. Discharge Planning / Barriers to Discharge  none identified - patients has post discharge therapy plan in place, transportation arranged for discharge day, adequate  "family support at home to assist with discharge to home.        Subjective:           History of Present Illness:     Neil Kirk is a 48 y.o. male who presents to the office today for a preoperative consultation at the request of surgeon. The patient understands this is an elective procedure and not emergent. They are electing to undergo planned procedure with an understanding that all surgery has inherent risk. They have worked with their surgeon and failed conservative treatment measures. Today they present for preoperative risk assessment and recommendations for optimization in preparation for surgery.    Pt seen in surgical optimization center for upcoming proposed surgery. They have failed previous conservative measures and have elected surgical intervention.     Pt meets presurgical lab and BMI optimization goals.    Upon interview questioning patient is able to perform greater than 4 METs workload in daily life without any reported cardio-pulmonary symptoms.            ROS: No TIA's or unusual headaches, no dysphagia.  No prolonged cough. No dyspnea or chest pain on exertion.  No abdominal pain, change in bowel habits, black or bloody stools.  No urinary tract or BPH symptoms.  Positive reported pain in arthritic joint. Positive difficulty with gait. No skin rashes or issues.      Objective:    /78   Ht 5' 7\" (1.702 m)   Wt 107 kg (236 lb)   BMI 36.96 kg/m²       General Appearance: no distress, conversive  HEENT: PERRLA, conjuctiva normal; oropharynx clear; mucous membranes moist;   Neck:  Supple, no lymphadenopathy or thyromegaly  Lungs: breath sounds normal, normal respiratory effort, no retractions, expiratory effort normal  CV: normal heart sounds S1/S2, PMI normal   ABD: soft non tender, no masses , no hepatic or splenomegaly  EXT: DP pulses intact, no lymphadenopathy, no edema  Skin: normal turgor, normal texture, no rash  Psych: affect normal, mood normal  Neuro: AAOx3        The " "following portions of the patient's history were reviewed and updated as appropriate: allergies, current medications, past family history, past medical history, past social history, past surgical history and problem list.     Past History:       Past Medical History:   Diagnosis Date   • Acute cystitis without hematuria    • Hyperlipidemia     Past Surgical History:   Procedure Laterality Date   • APPENDECTOMY     • KIDNEY STONE SURGERY     • MI CYSTO BLADDER W/URETERAL CATHETERIZATION Left 7/31/2017    Procedure: CYSTOSCOPY RETROGRADE PYELOGRAM WITH INSERTION STENT URETERAL;  Surgeon: Jaydon Sandoval MD;  Location: [facility] [place] OR;  Service: Urology   • MI CYSTO/URETERO W/LITHOTRIPSY &INDWELL STENT INSRT Left 8/24/2017    Procedure: CYSTOSCOPY URETEROSCOPY , RETROGRADE PYELOGRAM AND  STENT EXCHANGE URETERAL,ATTEMPTED STONE REMOVAL WITH BASKET;  Surgeon: Jaydon Sandoval MD;  Location: [facility] OR;  Service: Urology          Social History     Tobacco Use   • Smoking status: Every Day     Types: Cigars   Vaping Use   • Vaping status: Every Day   • Substances: Nicotine   Substance Use Topics   • Alcohol use: Yes     Comment: social   • Drug use: No     History reviewed. No pertinent family history.       Allergies:     Allergies   Allergen Reactions   • Lactose - Food Allergy GI Intolerance        Current Medications:     Current Outpatient Medications   Medication Instructions   • acetaminophen (TYLENOL) 650 mg, Oral, Every 6 hours PRN   • atorvastatin (LIPITOR) 40 mg tablet    • ibuprofen (MOTRIN) 800 mg, Oral, 3 times daily PRN   • meloxicam (MOBIC) 15 mg, Daily           PRE-OP WORKSHEET DATA    Assessment of Pre-Operative Risks     MLJ Quality Hard Stops:  BMI (<40) : Estimated body mass index is 36.96 kg/m² as calculated from the following:    Height as of this encounter: 5' 7\" (1.702 m).    Weight as of this encounter: 107 kg (236 lb).    Hgb ( >11):   Lab Results   Component Value Date    HGB 17.8 (H) 12/21/2023 " "    HbA1c (<7.5) : No results found for: \"HGBA1C\"  GFR (>60) (Less then 45 = Nephrology consult):  Estimated Creatinine Clearance: 113.3 mL/min (by C-G formula based on SCr of 0.91 mg/dL).      Active Decompensated Chronic Conditions which would delay surgery  No acutely decompensated medical issues such as recent CVA, MI, new onset arrhythmia, severe aortic stenosis, CHF, uncontrolled COPD       Exercise Capacity: (if less the 4 mets consider functional assessment via cardiac stress testing or consultation)    Able to walk 2 blocks without symptoms?: Yes  Able to walk 1 flights without symptoms?: Yes    Assessment of intra and post operative respiratory, hemodynamic and thrombotic risks     Prior Anesthesia Reactions: No     Personal history of venous thromboembolic disease? No    History of steroid use > 5 mg for >2 weeks within last year? No      The patient's risk factors for cardiac complications include :  none    Neil Kirk has an IN HOSPITAL cardiac risk of RCI RISK CLASS I (0 risk factors, risk of major cardiac compl. appr. 0.5%) based on RCRI calculator    Cardiac Risk Estimation: per the Revised Cardiac Risk Index (Circ. 100:1043, 1999),        Pre-Op Data Reviewed:       Laboratory Results: I have personally reviewed the pertinent laboratory results/reports     EKG:I have personally reviewed pertinent reports.  . I personally reviewed and interpreted available tracings in the electronic medical record    No recent EKG available for review    OLD RECORDS: reviewed old records in the chart review section if EHR on day of visit.    Previous cardiopulmonary studies within the past year:  Echocardiogram: no  Cardiac Catheterization: no  Stress Test: no      Time of visit including pre-visit chart review, visit and post-visit coordination of plan and care , review of pre-surgical lab work, preparation and time spent documenting note in electronic medical record, time spent face-to-face in physical " examination answering patient questions by care team 45 minutes             [facility]

## 2023-12-22 NOTE — ASSESSMENT & PLAN NOTE
Recommend ongoing attempts at weight loss  Current BMI meets criteria of <40 per MLJ preoperative qualifications

## 2023-12-22 NOTE — PATIENT INSTRUCTIONS
Contact surgical nurse  navigator with any questions regarding preoperative plan or schedule.  Stop all over the counter supplements, herbal, naturopathic  medications for 1 week prior to surgery UNLESS prescribed by your surgeon  Hold NSAIDS (i.e. advil, alleve, motrin, ibuprofen, celebrex) minimum 7 days prior to surgery  Hold Asprin minimum 7 days prior to surgery  Recommend using Tylenol ( acetaminophen ) 500mg every eight hours during the first week post discharge in conjunction with any additional pain medicine prescribed by your surgeon  Use bowel medicines prescribed by your surgeon ( colace) daily post op during the first 1-2 weeks to avoid post operative constipation issues  Call [phone] with any post discharge concerns or medical issues  The morning of surgery take only the following medication with small sip of water: none

## 2023-12-26 ENCOUNTER — OFFICE VISIT (OUTPATIENT)
Age: 48
End: 2023-12-26
Payer: OTHER MISCELLANEOUS

## 2023-12-26 VITALS
WEIGHT: 236 LBS | HEIGHT: 67 IN | DIASTOLIC BLOOD PRESSURE: 78 MMHG | BODY MASS INDEX: 37.04 KG/M2 | SYSTOLIC BLOOD PRESSURE: 134 MMHG

## 2023-12-26 DIAGNOSIS — E78.5 HYPERLIPIDEMIA, UNSPECIFIED HYPERLIPIDEMIA TYPE: ICD-10-CM

## 2023-12-26 DIAGNOSIS — Z01.818 PREOPERATIVE CLEARANCE: Primary | ICD-10-CM

## 2023-12-26 DIAGNOSIS — S83.206A TEAR OF MENISCUS OF RIGHT KNEE AS CURRENT INJURY: ICD-10-CM

## 2023-12-26 DIAGNOSIS — E66.9 OBESITY (BMI 30-39.9): ICD-10-CM

## 2023-12-26 DIAGNOSIS — Z72.0 TOBACCO USE: ICD-10-CM

## 2023-12-26 PROCEDURE — 99214 OFFICE O/P EST MOD 30 MIN: CPT | Performed by: INTERNAL MEDICINE

## 2024-01-04 ENCOUNTER — ANESTHESIA EVENT (OUTPATIENT)
Dept: PERIOP | Facility: HOSPITAL | Age: 49
End: 2024-01-04
Payer: OTHER MISCELLANEOUS

## 2024-01-05 ENCOUNTER — ANESTHESIA (OUTPATIENT)
Dept: PERIOP | Facility: HOSPITAL | Age: 49
End: 2024-01-05
Payer: OTHER MISCELLANEOUS

## 2024-01-05 ENCOUNTER — HOSPITAL ENCOUNTER (OUTPATIENT)
Facility: HOSPITAL | Age: 49
Setting detail: OUTPATIENT SURGERY
Discharge: HOME | End: 2024-01-05
Attending: ORTHOPAEDIC SURGERY | Admitting: ORTHOPAEDIC SURGERY
Payer: OTHER MISCELLANEOUS

## 2024-01-05 VITALS
BODY MASS INDEX: 41 KG/M2 | WEIGHT: 261.25 LBS | DIASTOLIC BLOOD PRESSURE: 78 MMHG | HEIGHT: 67 IN | OXYGEN SATURATION: 97 % | RESPIRATION RATE: 20 BRPM | TEMPERATURE: 97.1 F | HEART RATE: 66 BPM | SYSTOLIC BLOOD PRESSURE: 141 MMHG

## 2024-01-05 DIAGNOSIS — S83.241D OTHER TEAR OF MEDIAL MENISCUS OF RIGHT KNEE AS CURRENT INJURY, SUBSEQUENT ENCOUNTER: Primary | ICD-10-CM

## 2024-01-05 PROCEDURE — 29881 ARTHRS KNE SRG MNISECTMY M/L: CPT | Performed by: ORTHOPAEDIC SURGERY

## 2024-01-05 PROCEDURE — 29881 ARTHRS KNE SRG MNISECTMY M/L: CPT | Performed by: PHYSICIAN ASSISTANT

## 2024-01-05 RX ORDER — ONDANSETRON 2 MG/ML
INJECTION INTRAMUSCULAR; INTRAVENOUS AS NEEDED
Status: DISCONTINUED | OUTPATIENT
Start: 2024-01-05 | End: 2024-01-05

## 2024-01-05 RX ORDER — FENTANYL CITRATE/PF 50 MCG/ML
50 SYRINGE (ML) INJECTION
Status: COMPLETED | OUTPATIENT
Start: 2024-01-05 | End: 2024-01-05

## 2024-01-05 RX ORDER — OXYCODONE HYDROCHLORIDE AND ACETAMINOPHEN 5; 325 MG/1; MG/1
1 TABLET ORAL ONCE
Status: COMPLETED | OUTPATIENT
Start: 2024-01-05 | End: 2024-01-05

## 2024-01-05 RX ORDER — ONDANSETRON 2 MG/ML
4 INJECTION INTRAMUSCULAR; INTRAVENOUS EVERY 6 HOURS PRN
Status: DISCONTINUED | OUTPATIENT
Start: 2024-01-05 | End: 2024-01-05 | Stop reason: HOSPADM

## 2024-01-05 RX ORDER — BUPIVACAINE HYDROCHLORIDE 5 MG/ML
INJECTION, SOLUTION EPIDURAL; INTRACAUDAL AS NEEDED
Status: DISCONTINUED | OUTPATIENT
Start: 2024-01-05 | End: 2024-01-05 | Stop reason: HOSPADM

## 2024-01-05 RX ORDER — HYDROMORPHONE HCL/PF 1 MG/ML
0.4 SYRINGE (ML) INJECTION
Status: COMPLETED | OUTPATIENT
Start: 2024-01-05 | End: 2024-01-05

## 2024-01-05 RX ORDER — ASPIRIN 81 MG/1
81 TABLET ORAL 2 TIMES DAILY
Qty: 28 TABLET | Refills: 0 | Status: SHIPPED | OUTPATIENT
Start: 2024-01-05

## 2024-01-05 RX ORDER — ONDANSETRON 2 MG/ML
4 INJECTION INTRAMUSCULAR; INTRAVENOUS ONCE AS NEEDED
Status: DISCONTINUED | OUTPATIENT
Start: 2024-01-05 | End: 2024-01-05 | Stop reason: HOSPADM

## 2024-01-05 RX ORDER — LIDOCAINE HYDROCHLORIDE 10 MG/ML
INJECTION, SOLUTION EPIDURAL; INFILTRATION; INTRACAUDAL; PERINEURAL AS NEEDED
Status: DISCONTINUED | OUTPATIENT
Start: 2024-01-05 | End: 2024-01-05

## 2024-01-05 RX ORDER — MIDAZOLAM HYDROCHLORIDE 2 MG/2ML
INJECTION, SOLUTION INTRAMUSCULAR; INTRAVENOUS AS NEEDED
Status: DISCONTINUED | OUTPATIENT
Start: 2024-01-05 | End: 2024-01-05

## 2024-01-05 RX ORDER — CEFAZOLIN SODIUM 2 G/50ML
2000 SOLUTION INTRAVENOUS ONCE
Status: COMPLETED | OUTPATIENT
Start: 2024-01-05 | End: 2024-01-05

## 2024-01-05 RX ORDER — FENTANYL CITRATE 50 UG/ML
INJECTION, SOLUTION INTRAMUSCULAR; INTRAVENOUS AS NEEDED
Status: DISCONTINUED | OUTPATIENT
Start: 2024-01-05 | End: 2024-01-05

## 2024-01-05 RX ORDER — MAGNESIUM HYDROXIDE 1200 MG/15ML
LIQUID ORAL AS NEEDED
Status: DISCONTINUED | OUTPATIENT
Start: 2024-01-05 | End: 2024-01-05 | Stop reason: HOSPADM

## 2024-01-05 RX ORDER — SODIUM CHLORIDE, SODIUM LACTATE, POTASSIUM CHLORIDE, AND CALCIUM CHLORIDE .6; .31; .03; .02 G/100ML; G/100ML; G/100ML; G/100ML
IRRIGANT IRRIGATION AS NEEDED
Status: DISCONTINUED | OUTPATIENT
Start: 2024-01-05 | End: 2024-01-05 | Stop reason: HOSPADM

## 2024-01-05 RX ORDER — IBUPROFEN 800 MG/1
TABLET ORAL
Qty: 30 TABLET | Refills: 0 | Status: SHIPPED | OUTPATIENT
Start: 2024-01-05

## 2024-01-05 RX ORDER — CHLORHEXIDINE GLUCONATE 4 G/100ML
SOLUTION TOPICAL DAILY PRN
Status: DISCONTINUED | OUTPATIENT
Start: 2024-01-05 | End: 2024-01-05 | Stop reason: HOSPADM

## 2024-01-05 RX ORDER — OXYCODONE HYDROCHLORIDE AND ACETAMINOPHEN 5; 325 MG/1; MG/1
1 TABLET ORAL EVERY 4 HOURS PRN
Qty: 10 TABLET | Refills: 0 | Status: SHIPPED | OUTPATIENT
Start: 2024-01-05

## 2024-01-05 RX ORDER — SODIUM CHLORIDE, SODIUM LACTATE, POTASSIUM CHLORIDE, CALCIUM CHLORIDE 600; 310; 30; 20 MG/100ML; MG/100ML; MG/100ML; MG/100ML
125 INJECTION, SOLUTION INTRAVENOUS CONTINUOUS
Status: DISCONTINUED | OUTPATIENT
Start: 2024-01-05 | End: 2024-01-05 | Stop reason: HOSPADM

## 2024-01-05 RX ORDER — IBUPROFEN 400 MG/1
800 TABLET ORAL ONCE
Status: COMPLETED | OUTPATIENT
Start: 2024-01-05 | End: 2024-01-05

## 2024-01-05 RX ORDER — SODIUM CHLORIDE, SODIUM LACTATE, POTASSIUM CHLORIDE, CALCIUM CHLORIDE 600; 310; 30; 20 MG/100ML; MG/100ML; MG/100ML; MG/100ML
INJECTION, SOLUTION INTRAVENOUS CONTINUOUS PRN
Status: DISCONTINUED | OUTPATIENT
Start: 2024-01-05 | End: 2024-01-05

## 2024-01-05 RX ORDER — PROPOFOL 10 MG/ML
INJECTION, EMULSION INTRAVENOUS AS NEEDED
Status: DISCONTINUED | OUTPATIENT
Start: 2024-01-05 | End: 2024-01-05

## 2024-01-05 RX ORDER — ONDANSETRON 4 MG/1
4 TABLET, FILM COATED ORAL EVERY 8 HOURS PRN
Qty: 20 TABLET | Refills: 0 | Status: SHIPPED | OUTPATIENT
Start: 2024-01-05

## 2024-01-05 RX ADMIN — FENTANYL CITRATE 50 MCG: 50 INJECTION INTRAMUSCULAR; INTRAVENOUS at 09:05

## 2024-01-05 RX ADMIN — HYDROMORPHONE HYDROCHLORIDE 0.4 MG: 1 INJECTION, SOLUTION INTRAMUSCULAR; INTRAVENOUS; SUBCUTANEOUS at 09:15

## 2024-01-05 RX ADMIN — MIDAZOLAM HYDROCHLORIDE 2 MG: 1 INJECTION, SOLUTION INTRAMUSCULAR; INTRAVENOUS at 07:47

## 2024-01-05 RX ADMIN — IBUPROFEN 800 MG: 400 TABLET ORAL at 09:47

## 2024-01-05 RX ADMIN — SODIUM CHLORIDE 4 MCG: 9 INJECTION, SOLUTION INTRAVENOUS at 08:11

## 2024-01-05 RX ADMIN — OXYCODONE HYDROCHLORIDE AND ACETAMINOPHEN 1 TABLET: 5; 325 TABLET ORAL at 09:48

## 2024-01-05 RX ADMIN — SODIUM CHLORIDE 8 MCG: 9 INJECTION, SOLUTION INTRAVENOUS at 08:00

## 2024-01-05 RX ADMIN — FENTANYL CITRATE 25 MCG: 50 INJECTION, SOLUTION INTRAMUSCULAR; INTRAVENOUS at 08:12

## 2024-01-05 RX ADMIN — FENTANYL CITRATE 50 MCG: 50 INJECTION INTRAMUSCULAR; INTRAVENOUS at 08:59

## 2024-01-05 RX ADMIN — LIDOCAINE HYDROCHLORIDE 100 MG: 10 INJECTION, SOLUTION EPIDURAL; INFILTRATION; INTRACAUDAL; PERINEURAL at 07:51

## 2024-01-05 RX ADMIN — FENTANYL CITRATE 50 MCG: 50 INJECTION, SOLUTION INTRAMUSCULAR; INTRAVENOUS at 07:51

## 2024-01-05 RX ADMIN — SODIUM CHLORIDE 4 MCG: 9 INJECTION, SOLUTION INTRAVENOUS at 08:29

## 2024-01-05 RX ADMIN — SODIUM CHLORIDE, SODIUM LACTATE, POTASSIUM CHLORIDE, AND CALCIUM CHLORIDE: .6; .31; .03; .02 INJECTION, SOLUTION INTRAVENOUS at 07:48

## 2024-01-05 RX ADMIN — ONDANSETRON 4 MG: 2 INJECTION INTRAMUSCULAR; INTRAVENOUS at 08:27

## 2024-01-05 RX ADMIN — PROPOFOL 200 MG: 10 INJECTION, EMULSION INTRAVENOUS at 07:51

## 2024-01-05 RX ADMIN — CEFAZOLIN SODIUM 2000 MG: 2 SOLUTION INTRAVENOUS at 07:54

## 2024-01-05 RX ADMIN — FENTANYL CITRATE 25 MCG: 50 INJECTION, SOLUTION INTRAMUSCULAR; INTRAVENOUS at 08:14

## 2024-01-05 NOTE — OP NOTE
OPERATIVE REPORT  PATIENT NAME: Neil Kirk    :  1975  MRN: [id number]  Pt Location: [facility] OR ROOM 04    SURGERY DATE: 2024    Surgeons and Role:     * Abner Magallon,  - Primary     * Angi Ann PA-C - Assisting    Preop Diagnosis:  Complex tear of medial meniscus of right knee as current injury, subsequent encounter [S83.231D]    Post-Op Diagnosis Codes:     * Complex tear of medial meniscus of right knee as current injury, subsequent encounter [S83.231D]    Procedure(s):  Right - ARTHROSCOPY KNEE- Right Knee partial medial menisectomy    Specimen(s):  * No specimens in log *    Estimated Blood Loss:   Minimal, tourniquet used    Drains:  Ureteral Drain/Stent Left ureter 6 Fr. (Active)   Number of days: 2349       Anesthesia Type:   General, 20 cc 0.5% Marcaine local anesthetic.    Operative Indications:  Complex tear of medial meniscus of right knee as current injury, subsequent encounter [S83.231D]      Operative Findings:  See below  Complications:   None    Procedure and Technique:    Antibiotics: 2 g Ancef  IV Fluids: 500 cc crystalloid  Tourniquet time: 33 minutes    The patient was seen in the preoperative holding area and the appropriate site of surgery was confirmed and marked. Upon bringing the patient back to the operating room he was placed supine on the operating room table. A tourniquet was placed on the thigh of the operative leg and the leg was placed in the appropriate leg waters. The well leg was placed on a well-padded pillow. Esmarch was used to exsanguinate the leg and tourniquet was inflated to 300 mmHg. The right lower extremity was prepped and draped in the usual sterile fashion. An appropriate preoperative time-out was performed to once again confirm the site of surgery and procedure.    A lateral incision was made for an arthroscopic viewing portal and the arthroscopic camera was inserted. A diagnostic arthroscopy was performed and displayed grade 2  degenerative change in the superior and central patella without full-thickness defect or unstable flaps. A medial arthroscopic portal was made under direct visualization with the aid of an 18 gauge spinal needle. An arthroscopic probe was inserted and the remainder of the diagnostic arthroscopy was performed which further showed no full-thickness defect in the medial compartment articular surfaces though grade 2 chondral changes noted in the lateral aspect of the weightbearing portion of the medial femoral condyle with unstable chondral flaps.  Upon inspection of the medial meniscus, complex tearing was noted of the posterior horn extending into the body with horizontal component in the posterior horn and an unstable parrot-beak tear with concomitant undersurface cleavage tear in the posterior horn/body junction.  Remainder of the diagnostic arthroscopy revealed ACL to be intact.  Upon inspection of the lateral compartment, no chondral surfaces and meniscus were noted to be intact.  Attention was turned back to the medial compartment. An arthroscopic shaver was inserted to debride loose ends of the meniscal tear at the posterior horn and body.  Tissue in the posterior horn was noted to be significantly degenerative in nature and easily debrided with an arthroscopic shaver.  Due to the complex nature of the tear involving the residual parrot-beak in the posterior horn/body junction and concomitant undersurface cleavage tear, decision was made to complete the partial meniscectomy.  Arthroscopic biter was inserted to remove the unstable flap of meniscal tissue in the posterior aspect of the body of the medial meniscus.  Arthroscopic shaver was used to further remove loose fragments and contour the tear.  Camera was then placed in the medial portal and biter was used from the lateral portal to further contour the meniscus anteriorly.  This was followed once again by an arthroscopic shaver to remove the loose fragments.   Camera was placed back to lateral portal and arthroscopic biter was used from the medial portal to further debride unstable edges of the residual posterior horn of the meniscus.  Arthroscopic shaver was used once again to remove loose fragments and ensure proper contour of the remaining meniscus. The arthroscopic probe was once again inserted to ensure stable edges of the meniscus status post meniscectomy. Arthroscopic incisions were closed with 3-0 nylon suture.  20 cc of 0.5% Marcaine local anesthetic were used at the surgical site.  A sterile dressing was applied. The patient tolerated the procedure well no complications were noted. The patient was transferred to the PACU without any issue.       I was present for the entire procedure, A qualified resident physician was not available, and A physician assistant, Angi Ann PA-C, was required during the procedure for patient positioning, tissue handling, assistance with arthroscopic camera and equipment due to the minimally invasive nature of the surgical case, and suturing.    Postoperatively the patient will be weightbearing as tolerated right lower extremity.  He is instructed to take aspirin 81 mg twice daily for 2 weeks for DVT prophylaxis.  Patient is instructed to begin formal physical therapy next week to work on gentle range of motion and strengthening.  I will see him in the office as scheduled on 1/15/2024 for repeat evaluation and suture removal.    Patient Disposition:  PACU         SIGNATURE: Abner Magallon DO  DATE: January 5, 2024  TIME: 8:39 AM

## 2024-01-05 NOTE — ANESTHESIA PREPROCEDURE EVALUATION
Procedure:  ARTHROSCOPY KNEE- Right Knee partial medial menisectomy vs meniscus repair (Right: Knee)    Relevant Problems   CARDIO   (+) HLD (hyperlipidemia)      Other   (+) Obesity (BMI 30-39.9)   (+) Tobacco use        Physical Exam    Airway    Mallampati score: III  TM Distance: >3 FB  Neck ROM: full     Dental   Comment: Denies loose teeth     Cardiovascular  Cardiovascular exam normal    Pulmonary  Pulmonary exam normal     Other Findings  Portions of exam deferred due to low yield and/or unknown COVID status      Anesthesia Plan  ASA Score- 2     Anesthesia Type- general with ASA Monitors.         Additional Monitors:     Airway Plan: LMA.           Plan Factors-Exercise tolerance (METS): >4 METS.    Chart reviewed.   Existing labs reviewed. Patient summary reviewed.    Patient is a current smoker.              Induction- intravenous.    Postoperative Plan-     Informed Consent- Anesthetic plan and risks discussed with patient.  I personally reviewed this patient with the CRNA. Discussed and agreed on the Anesthesia Plan with the CRNA..

## 2024-01-05 NOTE — DISCHARGE INSTR - AVS FIRST PAGE
Knee Surgery: Postoperative Instructions  Dr. Abner Magallon  Diet    You may resume your regular diet as soon as possible  Medication    Take the pain medication as prescribed   Take pain medication with food   While taking pain medications, you may NOT operate a vehicle, heavy machinery, or appliances   While taking pain medication, you may NOT drink alcoholic beverages   If you have any reactions to your medications, stop taking them and call my office   If you are not allergic, take one aspirin 81mg twice a day to help prevent blood clots   Please keep in mind that constipation is a very common side effect of taking narcotic pain medication. Take precautions to prevent constipation:  o Drink plenty of water (6-8 glasses of 8 oz. a day)  o Avoid alcohol, caffeine, and dairy products  o Eat plenty of fiber (fruits, vegetables, and whole grains)  o Take an over the counter stool softener (Colace or Dulcolax)  Activity    RANGE OF MOTION   _____ You may bend your knee as much as the dressings will allow     WEIGHT BEARING   _____ You may weight bear as tolerated     You may practice quadriceps muscle tightening and straight leg raises several times every hour.   Please continue to move your ankle up and down and tighten and relax your calf muscles several times every hour to help reduce swelling and prevent blood clots.   You may use your crutches for balance as needed until your first post operative visit.   The optimal position of the leg after surgery is for you to be lying flat with your ankle higher than your knee and higher than your heart, in an effort to reduce swelling.   It is important to continuously elevate your knee above your heart until your swelling is completely down.   Please keep ice applied to the knee for at least the first 72 hours or as long as pain or swelling persists. Do not apply ice directly to skin, or allow water to leak on your dressing.  Showering    You may shower 3 days after  surgery unless told otherwise. DO NOT immerse the knee under water and DO NOT rub the incision. Pad the incisions dry and place new Band-Aids over the sutures after showering.    If you have a brace, you may remove it to shower. Keep your knee straight in the shower.   You may sponge bathe for the first 2 days, taking care to keep the dressing clean and dry.  Dressing Care    Keep the dressing clean and dry.   It is normal to get some bloody wound seepage through the bandage. DO NOT BE ALARMED.    If the dressing gets soaked with wound seepage, please reinforce with a dry sterile dressing.   Loosen the ace wrap around your knee if it becomes too tight or painful.   Remove all dressings 3  days after surgery. If there is still some wound seepage, apply a fresh STERILE gauze over the incisions and secure with tape or an ace wrap.    DO NOT TOUCH OR REMOVE THE SUTURES!!  Emergency/Follow-Up   Please notify my office at [phone] if you develop any fever (101 deg or above), unexpected warmth, redness or swelling. Please call if your toes become cold, purple, numb, or there is excessive bleeding.   Please call the office within 24 hours at [phone] to schedule a follow up appt within 7-10 days from surgery.

## 2024-01-05 NOTE — ANESTHESIA POSTPROCEDURE EVALUATION
Post-Op Assessment Note    CV Status:  Stable    Pain management: adequate       Mental Status:  Awake and sleepy   Hydration Status:  Euvolemic   PONV Controlled:  Controlled   Airway Patency:  Patent  Two or more mitigation strategies used for obstructive sleep apnea   Post Op Vitals Reviewed: Yes      Staff: CRNA               BP   124/76   Temp   98.9   Pulse  72   Resp   14   SpO2   97

## 2024-01-05 NOTE — INTERVAL H&P NOTE
H&P reviewed. After examining the patient I find no changes in the patients condition since the H&P had been written. Patient was seen and evaluated in the office where continued nonoperative vs surgical management of Right knee medial meniscus tear was discussed. In light of patients persistent pain, difficulty with ADLs and function, patient would like to move forward with surgical intervention. Risks of surgical intervention discussed in the office with patient and detailed consent obtained. Patient will go to OR on 01/05/2024 with Dr Magallon for Right knee arthroscopic partial medial menisectomy vs repair.     14 point ROS in office   Musculoskeletal Right knee pain      Heart RRR  Lungs CTA BL  Abdomen Present nontender      Right Knee  Range of motion from 0 to 120. Pain with terminal extension and flexion.     There is no crepitus with range of motion.   There is no effusion.    There is tenderness over the medial joint line.    There is 5/5 quadriceps strength and preserved muscle tone.    The patient is able to perform a straight leg raise.       negative patellar grind test.  Anterior drawer tests is negative  negative Lachman Test.   Posterior drawer test is   negative   Erica's testing is positive    The patient is neurovascular intact distally.    Vitals:    01/05/24 0701   BP: 156/99   Pulse: 70   Resp: 20   Temp: (!) 97 °F (36.1 °C)   SpO2: 96%

## 2024-01-08 ENCOUNTER — TELEPHONE (OUTPATIENT)
Age: 49
End: 2024-01-08

## 2024-01-08 NOTE — TELEPHONE ENCOUNTER
Pt contacted Call Center requested refill of their medication.        Medication Name: oxyCODONE-acetaminophen (Percocet)       Dosage of Med: 5-325 mg       Frequency of Med: 4-6 hours as needed      Remaining Medication: 0      Pharmacy and Location: [facility] [address] [facility] [employer], [place]        Pt. Preferred Callback Phone Number: [phone]      Thank you.      Patient PLEASE ADVISE PATIENTS:    REFILL REQUESTS WILL BE PROCESSED WITHIN 24-48 HOURS.

## 2024-01-08 NOTE — TELEPHONE ENCOUNTER
Caller: Patient     Doctor: Sherita    Reason for call: Patient stated he returning the call, but had not listened to the message from Dr. Magallon.  Patient stated he will listen to the message and call back if he has any further questions    Call back#: [phone]

## 2024-01-15 ENCOUNTER — OFFICE VISIT (OUTPATIENT)
Dept: OBGYN CLINIC | Facility: CLINIC | Age: 49
End: 2024-01-15

## 2024-01-15 VITALS
SYSTOLIC BLOOD PRESSURE: 142 MMHG | DIASTOLIC BLOOD PRESSURE: 95 MMHG | HEART RATE: 83 BPM | WEIGHT: 261 LBS | BODY MASS INDEX: 40.97 KG/M2 | HEIGHT: 67 IN

## 2024-01-15 DIAGNOSIS — Z48.89 AFTERCARE FOLLOWING SURGERY: ICD-10-CM

## 2024-01-15 DIAGNOSIS — Z98.890 S/P RIGHT KNEE ARTHROSCOPY: Primary | ICD-10-CM

## 2024-01-15 PROCEDURE — 99024 POSTOP FOLLOW-UP VISIT: CPT | Performed by: ORTHOPAEDIC SURGERY

## 2024-01-15 NOTE — PROGRESS NOTES
"Patient Name:  Neil Kirk  MRN:  [id number]    Assessment & Plan     1. S/P right knee arthroscopy    2. Aftercare following surgery        Approximately 10 day s/p Right knee arthroscopic partial medial menisectomy performed on 01/05/2024  Overall, patient doing very well s/p surgical intervention   Images reviewed and sutures removed today without complications  Patient was advised to discontinue the crutch within the next couple days and start outpatinet PT to work on motion and strengthening  Conitnue OTC Medication as needeed for pain relief  Work note provided to remain out of work until follow up appointment  Continue aspirin 81mg twice daily for 4 more days   Follow up in 4 weeks for reevaluation     History of the Present Illness   Neil Kirk is a 48 y.o. male approximately 10 day s/p Right knee arthroscopic partial medial menisectomy performed on 01/05/2024. Today, patient reports he is doing well since surgery. Patient has PT scheduled on Thursday. He is currently using one crutch for ambulation and occasionally a cane. He admits to daily improvements of pain and range of motion. He will administer Tylenol as needed.         Review of Systems     Review of Systems   Constitutional:  Negative for chills and fever.   HENT:  Negative for ear pain and sore throat.    Eyes:  Negative for pain and visual disturbance.   Respiratory:  Negative for cough and shortness of breath.    Cardiovascular:  Negative for chest pain and palpitations.   Gastrointestinal:  Negative for abdominal pain and vomiting.   Genitourinary:  Negative for dysuria and hematuria.   Musculoskeletal:  Negative for arthralgias and back pain.   Skin:  Negative for color change and rash.   Neurological:  Negative for seizures and syncope.   All other systems reviewed and are negative.      Physical Exam     /95   Pulse 83   Ht 5' 7\" (1.702 m)   Wt 118 kg (261 lb)   BMI 40.88 kg/m²     Right Knee  Surgical " incisions well healing, without signs of infection  Range of motion from 0 to 90 degrees.    There is trace post operative effusion.    There is minimal residual tenderness over the medial joint line.    The patient is able to perform a straight leg raise with 5/5 quad strength.     Calf soft, compressible and nontender   The patient is neurovascular intact distally.      Data Review     I have personally reviewed pertinent films in PACS, and my interpretation follows.    No new images    Social History     Tobacco Use    Smoking status: Every Day     Types: Cigars   Vaping Use    Vaping status: Every Day    Substances: Nicotine   Substance Use Topics    Alcohol use: Yes     Comment: social    Drug use: No           Procedures  None     Angi Ann PA-C

## 2024-01-15 NOTE — LETTER
January 15, 2024     Patient: Neil Kirk  YOB: 1975  Date of Visit: 1/15/2024      To Whom it May Concern:    Neil Kirk is under my professional care. Neil was seen in my office on 1/15/2024. Neil may not return to work at this time. He will follow up in office in 4 weeks for reevaluation and new work note will be provided.     If you have any questions or concerns, please don't hesitate to call.         Sincerely,          Abner Magallon,         CC: No Recipients

## 2024-01-18 ENCOUNTER — TELEPHONE (OUTPATIENT)
Age: 49
End: 2024-01-18

## 2024-01-18 ENCOUNTER — EVALUATION (OUTPATIENT)
Dept: PHYSICAL THERAPY | Facility: CLINIC | Age: 49
End: 2024-01-18
Payer: OTHER MISCELLANEOUS

## 2024-01-18 DIAGNOSIS — S83.241D OTHER TEAR OF MEDIAL MENISCUS OF RIGHT KNEE AS CURRENT INJURY, SUBSEQUENT ENCOUNTER: ICD-10-CM

## 2024-01-18 PROCEDURE — 97140 MANUAL THERAPY 1/> REGIONS: CPT | Performed by: PHYSICAL THERAPIST

## 2024-01-18 PROCEDURE — 97161 PT EVAL LOW COMPLEX 20 MIN: CPT | Performed by: PHYSICAL THERAPIST

## 2024-01-18 PROCEDURE — 97110 THERAPEUTIC EXERCISES: CPT | Performed by: PHYSICAL THERAPIST

## 2024-01-18 NOTE — TELEPHONE ENCOUNTER
WC       Faxed note from 1/15 to [phone]       Requesting a new note stating his work restrictions/excuse       Please fax when completed: [phone]     Attalberto corado         Callback: [phone]

## 2024-01-18 NOTE — PROGRESS NOTES
PT Evaluation     Today's date: 2024  Patient name: Neil Kirk  : 1975  MRN: [id number]  Referring provider: Angi Ann PA-C  Dx:   Encounter Diagnosis     ICD-10-CM    1. Other tear of medial meniscus of right knee as current injury, subsequent encounter  S83.241D Ambulatory Referral to Physical Therapy                     Assessment  Assessment details: Patient is a 48 year old male presenting to physical therapy with s/p R medial menisectomy.  Today, he presents with elevated pain levels, decreased strength, decreased range of motion, gait dysfunction, and decreased activity tolerance.  Patient was provided a home exercise program and demonstrated an understanding of exercises.  Patient was advised to stop performing home exercise program if symptoms increase or new complaints developed.  Verbal understanding demonstrated regarding home exercise program instructions.  Patient would benefit from skilled physical therapy services for prescribed exercises, manual interventions, neuromuscular re-education, education, and modalities as deemed appropriate to assist patient in achieving their maximum level of function.    Goals  STG - 4 weeks  Decreased subjective pain by 2 points  ROM 0-90 degrees  LTG - 12 weeks  Able to ambulate without limitation  Able to go up and down stairs without limitation  Able to complete squat based activities without limitation  Able to return to work activities      Plan  Patient would benefit from: skilled physical therapy  Planned modality interventions: cryotherapy  Planned therapy interventions: manual therapy, neuromuscular re-education, patient education, strengthening, stretching, therapeutic activities, therapeutic exercise, IADL retraining, home exercise program, balance and activity modification  Frequency: 2x week  Duration in weeks: 12        Subjective Evaluation    History of Present Illness  Mechanism of injury: Patient underwent a R sided  "partial medial menisectomy on 24.  He reports today that he has pain, swelling, and discomfort with movement.  Notes that he experienced gastroc pain before surgery and after the initial injury, continues to experience the same level of pain.  Notes that he is no longer ambulating with any assistive device but continues to have discomfort with ambulation.  Patient drives a truck for a living.  Patient Goals  Patient goals for therapy: decreased pain, improved balance, increased motion, independence with ADLs/IADLs and increased strength    Pain  Current pain ratin  At worst pain ratin          Objective     Tenderness     Additional Tenderness Details  Tenderness to palpation along the medial aspect of the knee around the medial joint line and     Active Range of Motion     Right Knee   Flexion: 70 degrees   Extension: -10 degrees     Passive Range of Motion     Right Knee   Flexion: 72 degrees with pain  Extension: -8 degrees with pain    Strength/Myotome Testing     Right Knee   Quadriceps contraction: poor    Additional Strength Details  Unable to complete a SLR    Tests     Additional Tests Details  No special tests secondary to surgery              Precautions: R medial menisectomy 24    Access Code: [email or web address]  URL: [email or web address]  Date: 2024  Prepared by: Bear Rivera    POC expires Unit limit Auth Expiration date PT/OT/ST + Visit Limit?   3/11 BOMN Pending Pending                           Visit/Unit Tracking  AUTH Status:  Date 24              Pending Used 1               Remaining                    Manuals 24            Inferior patellar mobilizations GM                                                   Neuro Re-Ed             Quad set 10x                                                                                          Ther Ex             Knee PROM GM            Heel slides 10x            Gastroc stretch 30\"X4                                    "                                          Ther Activity                                       Gait Training                                       Modalities             CP PRN

## 2024-01-22 ENCOUNTER — OFFICE VISIT (OUTPATIENT)
Dept: PHYSICAL THERAPY | Facility: CLINIC | Age: 49
End: 2024-01-22
Payer: OTHER MISCELLANEOUS

## 2024-01-22 DIAGNOSIS — S83.241D OTHER TEAR OF MEDIAL MENISCUS OF RIGHT KNEE AS CURRENT INJURY, SUBSEQUENT ENCOUNTER: Primary | ICD-10-CM

## 2024-01-22 PROCEDURE — 97110 THERAPEUTIC EXERCISES: CPT

## 2024-01-22 PROCEDURE — 97112 NEUROMUSCULAR REEDUCATION: CPT

## 2024-01-22 NOTE — PROGRESS NOTES
"Daily Note     Today's date: 2024  Patient name: Neil Kirk  : 1975  MRN: [id number]  Referring provider: Angi Ann PA-C  Dx:   Encounter Diagnosis     ICD-10-CM    1. Other tear of medial meniscus of right knee as current injury, subsequent encounter  S83.380D                      Subjective: Pt reports compliance with HEP and focuses on ROM.      Objective: See treatment diary below      Assessment: AROM R knee 13-83*.  After PROM, able to achieve 9* resting R knee ext.  Muscle guarding present during PROM.  Hypersensitivity around surgical scar.  Provided pt education on desensitization, pt demonstrates verbal understanding.  Poor quad set quality present.      Plan: Continue per plan of care.      Precautions: R medial menisectomy 24    Access Code: [email or web address]  URL: [email or web address]  Date: 2024  Prepared by: Bear Rivera    POC expires Unit limit Auth Expiration date PT/OT/ST + Visit Limit?   3/11 BOMN Pending Pending                           Visit/Unit Tracking  AUTH Status:  Date 24             Pending Used 1 2              Remaining                    Manuals 24           Inferior patellar mobilizations GM AF PROM                                                  Neuro Re-Ed             Desensitization  AF           Quad set 10x 3\" 2x10 & prone                                                                                         Ther Ex             Pt education  AF           Knee PROM GM AF           Heel slides 10x 5\"x10           Gastroc stretch 30\"X4 30'x4           SAQ  2x8           LAQ  2x10                                                  Ther Activity                                       Gait Training                                       Modalities             CP PRN                               "

## 2024-01-22 NOTE — PROGRESS NOTES
"Daily Note     Today's date: 2024  Patient name: Neil Kirk  : 1975  MRN: [id number]  Referring provider: Angi Ann PA-C  Dx:   Encounter Diagnosis     ICD-10-CM    1. Other tear of medial meniscus of right knee as current injury, subsequent encounter  S83.241D                      Subjective:       Objective: See treatment diary below      Assessment: Initiated TE as outlined below in daily  treatment diary.  Tolerated treatment {Tolerated treatment :[id number]}. Patient would benefit from continued PT      Plan: Continue per plan of care.      Precautions: R medial menisectomy 24    Access Code: [email or web address]  URL: [email or web address]  Date: 2024  Prepared by: Bear Rivera    POC expires Unit limit Auth Expiration date PT/OT/ST + Visit Limit?   3/11 BOMN Pending Pending                           Visit/Unit Tracking  AUTH Status:  Date 24             Pending Used 1 2              Remaining                    Manuals 24           Inferior patellar mobilizations GM ML                                                  Neuro Re-Ed             Quad set 10x 3\"x10                                                                                         Ther Ex             Knee PROM GM ML           Heel slides 10x 15x           Gastroc stretch 30\"X4 30\"x4                                                                            Ther Activity                                       Gait Training                                       Modalities             CP PRN                               "

## 2024-01-25 ENCOUNTER — OFFICE VISIT (OUTPATIENT)
Dept: PHYSICAL THERAPY | Facility: CLINIC | Age: 49
End: 2024-01-25
Payer: OTHER MISCELLANEOUS

## 2024-01-25 DIAGNOSIS — S83.241D OTHER TEAR OF MEDIAL MENISCUS OF RIGHT KNEE AS CURRENT INJURY, SUBSEQUENT ENCOUNTER: Primary | ICD-10-CM

## 2024-01-25 PROCEDURE — 97140 MANUAL THERAPY 1/> REGIONS: CPT | Performed by: PHYSICAL THERAPIST

## 2024-01-25 PROCEDURE — 97110 THERAPEUTIC EXERCISES: CPT | Performed by: PHYSICAL THERAPIST

## 2024-01-25 PROCEDURE — 97112 NEUROMUSCULAR REEDUCATION: CPT | Performed by: PHYSICAL THERAPIST

## 2024-01-25 NOTE — PROGRESS NOTES
"Daily Note     Today's date: 2024  Patient name: Neil Kirk  : 1975  MRN: [id number]  Referring provider: Angi Ann PA-C  Dx:   Encounter Diagnosis     ICD-10-CM    1. Other tear of medial meniscus of right knee as current injury, subsequent encounter  S83.241D                      Subjective: Patient reports that he is doing ok, notes that he is compliant with HEP.        Objective: See treatment diary below      Assessment: AROM to start today was 15-90.  PROM 5-95*.  Empty end feel with pain as the biggest limiting factor.  Quad activation is improving.  Sore with standing hip abduction.        Plan: Continue per plan of care.      Precautions: R medial menisectomy 24    Access Code: [email or web address]  URL: [email or web address]  Date: 2024  Prepared by: Bear Rivera    POC expires Unit limit Auth Expiration date PT/OT/ST + Visit Limit?   3/11 BOMN Pending Pending                           Visit/Unit Tracking  AUTH Status:  Date 24            30 Used 1 2 3             Remaining  29 28 27                Manuals 24          Inferior patellar mobilizations GM AF PROM GM                                                 Neuro Re-Ed             Desensitization  AF           Quad set 10x 3\" 2x10 & prone 3\" 20x supine                                                                                        Ther Ex             Pt education  AF GM          Knee PROM GM AF GM          Heel slides 10x 5\"x10           Gastroc stretch 30\"X4 30'x4           SAQ  2x8 2x8          LAQ  2x10 2x10          Standing hip abduction   20x                                    Ther Activity                                       Gait Training                                       Modalities             CP PRN                                 "

## 2024-01-29 ENCOUNTER — APPOINTMENT (OUTPATIENT)
Dept: PHYSICAL THERAPY | Facility: CLINIC | Age: 49
End: 2024-01-29
Payer: OTHER MISCELLANEOUS

## 2024-02-01 ENCOUNTER — OFFICE VISIT (OUTPATIENT)
Dept: PHYSICAL THERAPY | Facility: CLINIC | Age: 49
End: 2024-02-01
Payer: OTHER MISCELLANEOUS

## 2024-02-01 DIAGNOSIS — S83.241D OTHER TEAR OF MEDIAL MENISCUS OF RIGHT KNEE AS CURRENT INJURY, SUBSEQUENT ENCOUNTER: Primary | ICD-10-CM

## 2024-02-01 PROCEDURE — 97112 NEUROMUSCULAR REEDUCATION: CPT | Performed by: PHYSICAL THERAPIST

## 2024-02-01 PROCEDURE — 97110 THERAPEUTIC EXERCISES: CPT | Performed by: PHYSICAL THERAPIST

## 2024-02-01 PROCEDURE — 97140 MANUAL THERAPY 1/> REGIONS: CPT | Performed by: PHYSICAL THERAPIST

## 2024-02-01 NOTE — PROGRESS NOTES
"Daily Note     Today's date: 2024  Patient name: Neil Kirk  : 1975  MRN: [id number]  Referring provider: Angi Ann PA-C  Dx:   Encounter Diagnosis     ICD-10-CM    1. Other tear of medial meniscus of right knee as current injury, subsequent encounter  S83.241D                      Subjective: Patient reports he is doing ok, tried doing some yoga and felt ok afterward      Objective: See treatment diary below      Assessment: ROM continues to improve for the patient, patellar hypomobility remains for the patient in an inferior direction.  Unable to complete SLR secondary to weakness today.  Was able to make full revolutions ok bike today.  Progress as able.        Plan: Continue per plan of care.      Precautions: R medial menisectomy 24    Access Code: [email or web address]  URL: [email or web address]  Date: 2024  Prepared by: Bear Rivera    POC expires Unit limit Auth Expiration date PT/OT/ST + Visit Limit?   3/11 BOMN Pending Pending                           Visit/Unit Tracking  AUTH Status:  Date 24           30 Used 1 2 3 4            Remaining  29 28 27 26               Manuals 24         Inferior patellar mobilizations GM AF PROM GM GM                                                Neuro Re-Ed             Desensitization  AF           Quad set 10x 3\" 2x10 & prone 3\" 20x supine 20x         Weight shift    To SLS 10x                                                                          Ther Ex             Pt education  AF GM GM         Knee PROM GM AF GM GM         Heel slides 10x 5\"x10           Gastroc stretch 30\"X4 30'x4           SAQ  2x8 2x8 20x         LAQ  2x10 2x10          Standing hip abduction   20x          Bike    5 mins         SLR    AAROM 5'                                   Ther Activity             Mini squat    20x                      Gait Training                                       Modalities           "   CP PRN

## 2024-02-05 ENCOUNTER — OFFICE VISIT (OUTPATIENT)
Dept: PHYSICAL THERAPY | Facility: CLINIC | Age: 49
End: 2024-02-05
Payer: OTHER MISCELLANEOUS

## 2024-02-05 DIAGNOSIS — S83.241D OTHER TEAR OF MEDIAL MENISCUS OF RIGHT KNEE AS CURRENT INJURY, SUBSEQUENT ENCOUNTER: Primary | ICD-10-CM

## 2024-02-05 PROCEDURE — 97110 THERAPEUTIC EXERCISES: CPT

## 2024-02-05 PROCEDURE — 97112 NEUROMUSCULAR REEDUCATION: CPT

## 2024-02-05 NOTE — PROGRESS NOTES
"Daily Note     Today's date: 2024  Patient name: Neil Kirk  : 1975  MRN: [id number]  Referring provider: Angi Ann PA-C  Dx:   Encounter Diagnosis     ICD-10-CM    1. Other tear of medial meniscus of right knee as current injury, subsequent encounter  S83.241D                      Subjective: Pt reports he is starting to feel better.  He still experiences the most pain with knee flexion in surgical incision.  He reports \"locking\" when he bends.        Objective: See treatment diary below      Assessment: Inferior patellar ROM deficits present.  Requires therapist assist to perform SLR and is unable to perform without quad lag.  Gait improving, however decreased stance time remains.  AROM 14-92*.  Pt would benefit from continued PT in order to improve       Plan: Continue per plan of care.      Precautions: R medial menisectomy 24    Access Code: [email or web address]  URL: [email or web address]  Date: 2024  Prepared by: Bear Rivera    POC expires Unit limit Auth Expiration date PT/OT/ST + Visit Limit?   3/11 BOMN Pending Pending                           Visit/Unit Tracking  AUTH Status:  Date 24          30 Used 1 2 3 4 5           Remaining  29 28 27 26 25              Manuals 24 2        Inferior patellar mobilizations GM AF PROM GM GM AF PROM                                               Neuro Re-Ed             Desensitization  AF           Quad set 10x 3\" 2x10 & prone 3\" 20x supine 20x x20        Weight shift    To SLS 10x To SLS 10\"x10                                                                         Ther Ex             Pt education  AF GM GM AF        Knee PROM GM AF GM GM AF        Heel slides 10x 5\"x10           Gastroc stretch 30\"X4 30'x4           SAQ  2x8 2x8 20x 2x10        LAQ  2x10 2x10  3x10        Standing hip abduction   20x          Bike    5 mins 5'        SLR    AAROM 5' AAROM 2x10                   "                Ther Activity             Mini squat    20x                      Gait Training                                       Modalities             CP PRN

## 2024-02-08 ENCOUNTER — OFFICE VISIT (OUTPATIENT)
Dept: PHYSICAL THERAPY | Facility: CLINIC | Age: 49
End: 2024-02-08
Payer: OTHER MISCELLANEOUS

## 2024-02-08 ENCOUNTER — APPOINTMENT (OUTPATIENT)
Dept: PHYSICAL THERAPY | Facility: CLINIC | Age: 49
End: 2024-02-08
Payer: OTHER MISCELLANEOUS

## 2024-02-08 DIAGNOSIS — S83.241D OTHER TEAR OF MEDIAL MENISCUS OF RIGHT KNEE AS CURRENT INJURY, SUBSEQUENT ENCOUNTER: Primary | ICD-10-CM

## 2024-02-08 PROCEDURE — 97530 THERAPEUTIC ACTIVITIES: CPT | Performed by: PHYSICAL THERAPIST

## 2024-02-08 PROCEDURE — 97140 MANUAL THERAPY 1/> REGIONS: CPT | Performed by: PHYSICAL THERAPIST

## 2024-02-08 PROCEDURE — 97110 THERAPEUTIC EXERCISES: CPT | Performed by: PHYSICAL THERAPIST

## 2024-02-12 ENCOUNTER — OFFICE VISIT (OUTPATIENT)
Dept: OBGYN CLINIC | Facility: CLINIC | Age: 49
End: 2024-02-12

## 2024-02-12 ENCOUNTER — OFFICE VISIT (OUTPATIENT)
Dept: PHYSICAL THERAPY | Facility: CLINIC | Age: 49
End: 2024-02-12
Payer: OTHER MISCELLANEOUS

## 2024-02-12 VITALS
BODY MASS INDEX: 40.18 KG/M2 | DIASTOLIC BLOOD PRESSURE: 89 MMHG | HEART RATE: 76 BPM | SYSTOLIC BLOOD PRESSURE: 134 MMHG | WEIGHT: 256 LBS | HEIGHT: 67 IN

## 2024-02-12 DIAGNOSIS — S83.241D OTHER TEAR OF MEDIAL MENISCUS OF RIGHT KNEE AS CURRENT INJURY, SUBSEQUENT ENCOUNTER: Primary | ICD-10-CM

## 2024-02-12 DIAGNOSIS — Z48.89 AFTERCARE FOLLOWING SURGERY: ICD-10-CM

## 2024-02-12 DIAGNOSIS — Z98.890 S/P RIGHT KNEE ARTHROSCOPY: Primary | ICD-10-CM

## 2024-02-12 PROCEDURE — 97140 MANUAL THERAPY 1/> REGIONS: CPT | Performed by: PHYSICAL THERAPIST

## 2024-02-12 PROCEDURE — 97530 THERAPEUTIC ACTIVITIES: CPT | Performed by: PHYSICAL THERAPIST

## 2024-02-12 PROCEDURE — 97110 THERAPEUTIC EXERCISES: CPT | Performed by: PHYSICAL THERAPIST

## 2024-02-12 PROCEDURE — 99024 POSTOP FOLLOW-UP VISIT: CPT | Performed by: ORTHOPAEDIC SURGERY

## 2024-02-12 NOTE — PROGRESS NOTES
"Patient Name:  Neil Kirk  MRN:  [id number]    Assessment & Plan     1. S/P right knee arthroscopy    2. Aftercare following surgery      Approximately 5 week s/p Right knee arthroscopic partial medial menisectomy performed on 01/05/2024  Overall, patient doing well s/p surgical intervention  Advised patient, he may continue outpatient PT to work on quad strengthening and range of motion. Continue home exercises.   Work note provided with restrictions for 2 weeks, then may return without restrictions.   Continue OTC medications as needed for pain relief  Follow up in 6 weeks for reevaluation    History of the Present Illness   Neil Kirk is a 48 y.o. male approximately 5 week s/p Right knee arthroscopic partial medial menisectomy performed on 01/05/2024. Today, patient reports he is feeling about 80% improved since surgery, \"it's definitely way better than it was\". He continues to attend outpatient PT and working on range of motion and quad strengthening. He has been taking his dogs out for walks recently as he has been feeling better. He has continued with OTC medications.              Review of Systems     Review of Systems   Constitutional:  Negative for chills and fever.   HENT:  Negative for ear pain and sore throat.    Eyes:  Negative for pain and visual disturbance.   Respiratory:  Negative for cough and shortness of breath.    Cardiovascular:  Negative for chest pain and palpitations.   Gastrointestinal:  Negative for abdominal pain and vomiting.   Genitourinary:  Negative for dysuria and hematuria.   Musculoskeletal:  Negative for arthralgias and back pain.   Skin:  Negative for color change and rash.   Neurological:  Negative for seizures and syncope.   All other systems reviewed and are negative.      Physical Exam     /89   Pulse 76   Ht 5' 7\" (1.702 m)   Wt 116 kg (256 lb)   BMI 40.10 kg/m²     Right Knee  Surgical incisions well healing, without signs of " infection  Range of motion from 0 to 120 degrees without pain.    There is small post operative effusion.    There is tenderness over the medial joint line and over MCL, appropriate for post operative course.    The patient is able to perform a straight leg raise with 4+/5 quad strength and improving.     Calf soft, compressible and nontender   The patient is neurovascular intact distally.      Data Review     I have personally reviewed pertinent films in PACS, and my interpretation follows.    No new images    Social History     Tobacco Use    Smoking status: Every Day     Types: Cigars   Vaping Use    Vaping status: Every Day    Substances: Nicotine   Substance Use Topics    Alcohol use: Yes     Comment: social    Drug use: No           Procedures  None     Angi Ann PA-C

## 2024-02-12 NOTE — LETTER
February 12, 2024     Patient: Neil Kirk  YOB: 1975  Date of Visit: 2/12/2024      To Whom it May Concern:    Neil Kirk is under my professional care. Neil was seen in my office on 2/12/2024. Neil may continue restrictions for the next 2 weeks. He may return to work after 2 weeks without restrictions. He will follow up in 4 weeks for reevaluation.    If you have any questions or concerns, please don't hesitate to call.         Sincerely,          Abner Magallon,         CC: No Recipients

## 2024-02-12 NOTE — PROGRESS NOTES
"Daily Note     Today's date: 2024  Patient name: Neil Kirk  : 1975  MRN: [id number]  Referring provider: Angi Ann PA-C  Dx:   Encounter Diagnosis     ICD-10-CM    1. Other tear of medial meniscus of right knee as current injury, subsequent encounter  S83.241D           Start Time: 1430  Stop Time: 1502  Total time in clinic (min): 32 minutes    Subjective: Patient reports that he is doing ok, functionally, he states that stairs are the most challenging for him.        Objective: See treatment diary below      Assessment: AROM 5-120* today, passively 0-125*.  Empty end feel.  Fair quad set, requires assistance to complete SLR secondary to weakness.  Near even weight distribution present during squatting activities.  Mild vaulting during step up secondary to weakness.  Pain and weakness are the biggest limiting factors for the patient.        Plan: Continue per plan of care.      Precautions: R medial menisectomy 24    Access Code: [email or web address]  URL: [email or web address]  Date: 2024  Prepared by: Bear Rivera    POC expires Unit limit Auth Expiration date PT/OT/ST + Visit Limit?   3/11 BOMN Pending Pending                           Visit/Unit Tracking  AUTH Status:  Date 24        30 Used 1 2 3 4 5 6 7         Remaining  29 28 27 26 25 24 23            Manuals 24      Inferior patellar mobilizations GM AF PROM GM GM AF PROM GM inferior  GM inferior                                             Neuro Re-Ed             Desensitization  AF           Quad set 10x 3\" 2x10 & prone 3\" 20x supine 20x x20  20x      Weight shift    To SLS 10x To SLS 10\"x10  SLS 10\"X10                                                                       Ther Ex             Pt education  AF GM GM AF GM GM      Knee PROM GM AF GM GM AF GM GM      Heel slides 10x 5\"x10           Gastroc stretch 30\"X4 30'x4           SAQ  2x8 2x8 " "20x 2x10 3x10 30X      LAQ  2x10 2x10  3x10 2x10 20X      Standing hip abduction   20x          Bike    5 mins 5' 6' 7'      SLR    AAROM 5' AAROM 2x10 AAROM 2x10 AAROM 2x10      HSS      Seated and supine 10\"X10 Seated 30\"x4                   Ther Activity             Mini squat    20x  20x 20x      Step up      6\" 20x 20x      Gait Training                                       Modalities             CP PRN                                         "

## 2024-02-15 ENCOUNTER — OFFICE VISIT (OUTPATIENT)
Dept: PHYSICAL THERAPY | Facility: CLINIC | Age: 49
End: 2024-02-15
Payer: OTHER MISCELLANEOUS

## 2024-02-15 DIAGNOSIS — S83.241D OTHER TEAR OF MEDIAL MENISCUS OF RIGHT KNEE AS CURRENT INJURY, SUBSEQUENT ENCOUNTER: ICD-10-CM

## 2024-02-15 DIAGNOSIS — S83.241D OTHER TEAR OF MEDIAL MENISCUS OF RIGHT KNEE AS CURRENT INJURY, SUBSEQUENT ENCOUNTER: Primary | ICD-10-CM

## 2024-02-15 PROCEDURE — 97140 MANUAL THERAPY 1/> REGIONS: CPT | Performed by: PHYSICAL THERAPIST

## 2024-02-15 PROCEDURE — 97110 THERAPEUTIC EXERCISES: CPT | Performed by: PHYSICAL THERAPIST

## 2024-02-15 PROCEDURE — 97112 NEUROMUSCULAR REEDUCATION: CPT | Performed by: PHYSICAL THERAPIST

## 2024-02-15 PROCEDURE — 97530 THERAPEUTIC ACTIVITIES: CPT | Performed by: PHYSICAL THERAPIST

## 2024-02-15 RX ORDER — IBUPROFEN 800 MG/1
TABLET ORAL
Qty: 30 TABLET | Refills: 0 | Status: SHIPPED | OUTPATIENT
Start: 2024-02-15

## 2024-02-15 NOTE — PROGRESS NOTES
"Daily Note     Today's date: 2/15/2024  Patient name: Neil Kirk  : 1975  MRN: [id number]  Referring provider: Angi Ann PA-C  Dx:   Encounter Diagnosis     ICD-10-CM    1. Other tear of medial meniscus of right knee as current injury, subsequent encounter  S83.241D                   Subjective: Pt reports that it has been feeling good after therapy sessions.       Objective: See treatment diary below      Assessment: Tolerated treatment fair. Patient demonstrated fatigue post treatment, exhibited good technique with therapeutic exercises, and would benefit from continued PT. Pt did require verbal cues to decrease anterior tibial translation during squatting. He was able to perform SLS today without UE assistance, did have occasional stepping strategy to maintain upright balance. He was able to perform SLR today with all AROM, while maintaining TKE.      Plan: Continue per plan of care.      Precautions: R medial menisectomy 24    Access Code: [email or web address]  URL: [email or web address]  Date: 2024  Prepared by: Bear Rivera    POC expires Unit limit Auth Expiration date PT/OT/ST + Visit Limit?   3/11 BOMN Pending Pending                           Visit/Unit Tracking  AUTH Status:  Date 1/18/24 1/22 1/25 2/1 2/5 2/8 2/12 2/15       30 Used 1 2 3 4 5 6 7 8        Remaining  29 28 27 26 25 24 23 22           Manuals 24/ 2/5 2/8 2/12 2/15     Inferior patellar mobilizations GM AF PROM GM GM AF PROM GM inferior  GM inferior GM inferior gr 2-3                                            Neuro Re-Ed             Desensitization  AF           Quad set 10x 3\" 2x10 & prone 3\" 20x supine 20x x20  20x 20x     Weight shift    To SLS 10x To SLS 10\"x10  SLS 10\"X10 SLS 10\"X10                                                                      Ther Ex             Pt education  AF GM GM AF GM GM KB     Knee PROM GM AF GM GM AF GM GM KB     Heel slides 10x 5\"x10         " "  Gastroc stretch 30\"X4 30'x4           SAQ  2x8 2x8 20x 2x10 3x10 30X 3x10     LAQ  2x10 2x10  3x10 2x10 20X 2x10     Standing hip abduction   20x          Bike for ROM    5 mins 5' 6' 7' 7'     SLR    AAROM 5' AAROM 2x10 AAROM 2x10 AAROM 2x10 AROM 2x10     HSS      Seated and supine 10\"X10 Seated 30\"x4 Seated 30\"x4                  Ther Activity             Mini squat    20x  20x 20x 20x     Step up      6\" 20x 20x 6\" 20x     Gait Training                                       Modalities             CP PRN                                           "

## 2024-02-19 ENCOUNTER — OFFICE VISIT (OUTPATIENT)
Dept: PHYSICAL THERAPY | Facility: CLINIC | Age: 49
End: 2024-02-19
Payer: OTHER MISCELLANEOUS

## 2024-02-19 DIAGNOSIS — S83.241D OTHER TEAR OF MEDIAL MENISCUS OF RIGHT KNEE AS CURRENT INJURY, SUBSEQUENT ENCOUNTER: Primary | ICD-10-CM

## 2024-02-19 PROCEDURE — 97140 MANUAL THERAPY 1/> REGIONS: CPT | Performed by: PHYSICAL THERAPIST

## 2024-02-19 PROCEDURE — 97530 THERAPEUTIC ACTIVITIES: CPT | Performed by: PHYSICAL THERAPIST

## 2024-02-19 PROCEDURE — 97116 GAIT TRAINING THERAPY: CPT | Performed by: PHYSICAL THERAPIST

## 2024-02-19 PROCEDURE — 97112 NEUROMUSCULAR REEDUCATION: CPT | Performed by: PHYSICAL THERAPIST

## 2024-02-19 NOTE — PROGRESS NOTES
"Daily Note     Today's date: 2024  Patient name: Neil Kirk  : 1975  MRN: [id number]  Referring provider: Angi Ann PA-C  Dx:   Encounter Diagnosis     ICD-10-CM    1. Other tear of medial meniscus of right knee as current injury, subsequent encounter  S83.241D                      Subjective: Patient reports that he continues to have pain and swelling in the knee      Objective: See treatment diary below      Assessment: Inferior patellar hypomobility remains.  Quad set is improving, able to do 10 SLR today without assistance.  Equal weight shift present with squat.        Plan: Continue per plan of care.      Precautions: R medial menisectomy 24    Access Code: [email or web address]  URL: [email or web address]  Date: 2024  Prepared by: Bear Rivera    POC expires Unit limit Auth Expiration date PT/OT/ST + Visit Limit?   3/11 BOMN Pending Pending                           Visit/Unit Tracking  AUTH Status:  Date 24 2/1 2/ 2/8 2/12 2/15       30 Used 1 2 3 4 5 6 7 8        Remaining  29 28 27 26 25 24 23 22           Manuals 24//5 2/8 2/12 2/15 2/19    Inferior patellar mobilizations GM AF PROM GM GM AF PROM GM inferior  GM inferior GM inferior gr 2-3 GM inferior Gr 3/4                                           Neuro Re-Ed             Desensitization  AF           Quad set 10x 3\" 2x10 & prone 3\" 20x supine 20x x20  20x 20x 20x    Weight shift    To SLS 10x To SLS 10\"x10  SLS 10\"X10 SLS 10\"X10 SLS 30\"x2                                                                     Ther Ex             Pt education  AF GM GM AF GM GM KB GM    Knee PROM GM AF GM GM AF GM GM KB GM    Heel slides 10x 5\"x10           Gastroc stretch 30\"X4 30'x4           SAQ  2x8 2x8 20x 2x10 3x10 30X 3x10 3x10    LAQ  2x10 2x10  3x10 2x10 20X 2x10 2x10    Standing hip abduction   20x          Bike for ROM    5 mins 5' 6' 7' 7' 8'    SLR    AAROM 5' AAROM 2x10 AAROM 2x10 " "AAROM 2x10 AROM 2x10 AROM 2x10    HSS      Seated and supine 10\"X10 Seated 30\"x4 Seated 30\"x4                  Ther Activity             Mini squat    20x  20x 20x 20x 30x    Step up      6\" 20x 20x 6\" 20x 30x 6\"    Gait Training                                       Modalities             CP PRN                                             "

## 2024-02-20 ENCOUNTER — OFFICE VISIT (OUTPATIENT)
Dept: FAMILY MEDICINE CLINIC | Facility: CLINIC | Age: 49
End: 2024-02-20
Payer: COMMERCIAL

## 2024-02-20 VITALS
WEIGHT: 258 LBS | SYSTOLIC BLOOD PRESSURE: 132 MMHG | OXYGEN SATURATION: 99 % | HEIGHT: 67 IN | DIASTOLIC BLOOD PRESSURE: 84 MMHG | HEART RATE: 77 BPM | TEMPERATURE: 97.9 F | BODY MASS INDEX: 40.49 KG/M2

## 2024-02-20 DIAGNOSIS — Z87.442 HISTORY OF KIDNEY STONES: ICD-10-CM

## 2024-02-20 DIAGNOSIS — Z12.5 SCREENING FOR PROSTATE CANCER: ICD-10-CM

## 2024-02-20 DIAGNOSIS — Z13.220 NEED FOR LIPID SCREENING: ICD-10-CM

## 2024-02-20 DIAGNOSIS — R31.9 HEMATURIA, UNSPECIFIED TYPE: ICD-10-CM

## 2024-02-20 DIAGNOSIS — R53.83 OTHER FATIGUE: ICD-10-CM

## 2024-02-20 DIAGNOSIS — R73.01 IMPAIRED FASTING GLUCOSE: Primary | ICD-10-CM

## 2024-02-20 PROBLEM — N17.9 AKI (ACUTE KIDNEY INJURY) (HCC): Status: RESOLVED | Noted: 2017-07-31 | Resolved: 2024-02-20

## 2024-02-20 PROBLEM — E87.1 HYPONATREMIA: Status: RESOLVED | Noted: 2017-07-31 | Resolved: 2024-02-20

## 2024-02-20 PROCEDURE — 99203 OFFICE O/P NEW LOW 30 MIN: CPT | Performed by: FAMILY MEDICINE

## 2024-02-20 NOTE — PROGRESS NOTES
Name: Neil Kirk      : 1975      MRN: [id number]  Encounter Provider: Adrian Butcher MD  Encounter Date: 2024   Encounter department: [facility]    Assessment & Plan     1. Impaired fasting glucose  -     Comprehensive metabolic panel; Future  -     Hemoglobin A1C; Future    2. Need for lipid screening  -     Lipid panel; Future    3. Screening for prostate cancer  -     PSA, Total Screen; Future    4. Other fatigue  -     CBC and differential; Future; Expected date: 2024    5. Hematuria, unspecified type  -     Urinalysis with microscopic; Future    6. History of kidney stones  Denies any symptoms    Follow up in 1 year or as needed         Subjective     Patient is here to establish care.  He has a hx of pre-diabetes denies any symptoms related to this.  Also has a hx of kidney stones as well as hematuria. Denies any flank pain at this time. Calcium oxalate stones per chart review.        Review of Systems   Constitutional:  Negative for activity change, appetite change, fatigue and fever.   HENT:  Negative for congestion and ear discharge.    Respiratory:  Negative for cough and shortness of breath.    Cardiovascular:  Negative for chest pain and palpitations.   Gastrointestinal:  Negative for diarrhea and nausea.   Musculoskeletal:  Negative for arthralgias and back pain.   Skin:  Negative for color change and rash.   Neurological:  Negative for dizziness and headaches.   Psychiatric/Behavioral:  Negative for agitation and behavioral problems.        Past Medical History:   Diagnosis Date    Acute cystitis without hematuria     Hyperlipidemia      Past Surgical History:   Procedure Laterality Date    APPENDECTOMY      KIDNEY STONE SURGERY      KNEE SURGERY      MS ARTHRS KNE SURG W/MENISCECTOMY MED/LAT W/SHVG Right 2024    Procedure: ARTHROSCOPY KNEE- Right Knee partial medial menisectomy;  Surgeon: Abner Magallon DO;  Location: [facility];  Service:  Orthopedics    DE CYSTO BLADDER W/URETERAL CATHETERIZATION Left 07/31/2017    Procedure: CYSTOSCOPY RETROGRADE PYELOGRAM WITH INSERTION STENT URETERAL;  Surgeon: Jaydon Sandoval MD;  Location: [facility];  Service: Urology    DE CYSTO/URETERO W/LITHOTRIPSY &INDWELL STENT INSRT Left 08/24/2017    Procedure: CYSTOSCOPY URETEROSCOPY , RETROGRADE PYELOGRAM AND  STENT EXCHANGE URETERAL,ATTEMPTED STONE REMOVAL WITH BASKET;  Surgeon: Jaydon Sandoval MD;  Location: [facility];  Service: Urology     History reviewed. No pertinent family history.  Social History     Socioeconomic History    Marital status: /Civil Union     Spouse name: None    Number of children: None    Years of education: None    Highest education level: None   Occupational History    None   Tobacco Use    Smoking status: Every Day     Types: Cigars    Smokeless tobacco: None   Vaping Use    Vaping status: Every Day    Substances: Nicotine   Substance and Sexual Activity    Alcohol use: Yes     Comment: social    Drug use: No    Sexual activity: Yes     Partners: Female     Birth control/protection: None   Other Topics Concern    None   Social History Narrative    None     Social Determinants of Health     Financial Resource Strain: Low Risk  (2/22/2023)    Received from [facility]    Overall Financial Resource Strain (CARDIA)     Difficulty of Paying Living Expenses: Not hard at all   Food Insecurity: No Food Insecurity (2/22/2023)    Received from [facility]    Hunger Vital Sign     Worried About Running Out of Food in the Last Year: Never true     Ran Out of Food in the Last Year: Never true   Transportation Needs: No Transportation Needs (2/22/2023)    Received from [facility]    PRAPARE - Transportation     Lack of Transportation (Medical): No     Lack of Transportation (Non-Medical): No   Physical Activity: Not on file   Stress: Not on file   Social Connections: Not on file   Intimate  "Partner Violence: Not At Risk (2/22/2023)    Received from [facility]    Humiliation, Afraid, Rape, and Kick questionnaire     Fear of Current or Ex-Partner: No     Emotionally Abused: No     Physically Abused: No     Sexually Abused: No   Housing Stability: Low Risk  (2/22/2023)    Received from [facility]    Housing Stability Vital Sign     Unable to Pay for Housing in the Last Year: No     Number of Places Lived in the Last Year: 1     Unstable Housing in the Last Year: No     Current Outpatient Medications on File Prior to Visit   Medication Sig    acetaminophen (TYLENOL) 325 mg tablet Take 2 tablets (650 mg total) by mouth every 6 (six) hours as needed for mild pain    atorvastatin (LIPITOR) 40 mg tablet     ibuprofen (MOTRIN) 800 mg tablet Take 1 tablet by mouth 3 times daily, then take as needed. Take with food and water. Discontinue if stomach upset occurs.     No Known Allergies  Immunization History   Administered Date(s) Administered    COVID-19 PFIZER VACCINE 0.3 ML IM 06/05/2021, 06/26/2021, 01/07/2022    INFLUENZA 09/14/2020    Tdap 08/20/2020       Objective     /84 (BP Location: Right arm, Patient Position: Sitting)   Pulse 77   Temp 97.9 °F (36.6 °C) (Temporal)   Ht 5' 7\" (1.702 m)   Wt 117 kg (258 lb)   SpO2 99%   BMI 40.41 kg/m²     Physical Exam  Constitutional:       General: He is not in acute distress.     Appearance: He is well-developed. He is not diaphoretic.   Eyes:      General: No scleral icterus.     Pupils: Pupils are equal, round, and reactive to light.   Cardiovascular:      Rate and Rhythm: Normal rate and regular rhythm.      Heart sounds: Normal heart sounds. No murmur heard.  Pulmonary:      Effort: Pulmonary effort is normal. No respiratory distress.      Breath sounds: Normal breath sounds. No wheezing.   Abdominal:      General: Bowel sounds are normal. There is no distension.      Palpations: Abdomen is soft.      Tenderness: " There is no abdominal tenderness.   Skin:     General: Skin is warm and dry.      Findings: No rash.   Neurological:      Mental Status: He is alert and oriented to person, place, and time.       Adrian Butcher MD

## 2024-02-21 ENCOUNTER — APPOINTMENT (OUTPATIENT)
Dept: PHYSICAL THERAPY | Facility: CLINIC | Age: 49
End: 2024-02-21
Payer: OTHER MISCELLANEOUS

## 2024-02-23 ENCOUNTER — APPOINTMENT (OUTPATIENT)
Dept: LAB | Facility: CLINIC | Age: 49
End: 2024-02-23
Payer: COMMERCIAL

## 2024-02-23 DIAGNOSIS — Z13.220 NEED FOR LIPID SCREENING: ICD-10-CM

## 2024-02-23 DIAGNOSIS — R73.01 IMPAIRED FASTING GLUCOSE: ICD-10-CM

## 2024-02-23 DIAGNOSIS — Z12.5 SCREENING FOR PROSTATE CANCER: ICD-10-CM

## 2024-02-23 DIAGNOSIS — R53.83 OTHER FATIGUE: ICD-10-CM

## 2024-02-23 DIAGNOSIS — R31.9 HEMATURIA, UNSPECIFIED TYPE: ICD-10-CM

## 2024-02-23 LAB
ALBUMIN SERPL BCP-MCNC: 4.7 G/DL (ref 3.5–5)
ALP SERPL-CCNC: 93 U/L (ref 34–104)
ALT SERPL W P-5'-P-CCNC: 37 U/L (ref 7–52)
ANION GAP SERPL CALCULATED.3IONS-SCNC: 9 MMOL/L
AST SERPL W P-5'-P-CCNC: 17 U/L (ref 13–39)
BACTERIA UR QL AUTO: ABNORMAL /HPF
BILIRUB SERPL-MCNC: 0.76 MG/DL (ref 0.2–1)
BILIRUB UR QL STRIP: NEGATIVE
BUN SERPL-MCNC: 19 MG/DL (ref 5–25)
CALCIUM SERPL-MCNC: 9.4 MG/DL (ref 8.4–10.2)
CHLORIDE SERPL-SCNC: 104 MMOL/L (ref 96–108)
CHOLEST SERPL-MCNC: 229 MG/DL
CLARITY UR: CLEAR
CO2 SERPL-SCNC: 28 MMOL/L (ref 21–32)
COLOR UR: YELLOW
CREAT SERPL-MCNC: 0.86 MG/DL (ref 0.6–1.3)
EST. AVERAGE GLUCOSE BLD GHB EST-MCNC: 103 MG/DL
GFR SERPL CREATININE-BSD FRML MDRD: 101 ML/MIN/1.73SQ M
GLUCOSE P FAST SERPL-MCNC: 86 MG/DL (ref 65–99)
GLUCOSE UR STRIP-MCNC: NEGATIVE MG/DL
HBA1C MFR BLD: 5.2 %
HDLC SERPL-MCNC: 45 MG/DL
HGB UR QL STRIP.AUTO: ABNORMAL
KETONES UR STRIP-MCNC: NEGATIVE MG/DL
LDLC SERPL CALC-MCNC: 146 MG/DL (ref 0–100)
LEUKOCYTE ESTERASE UR QL STRIP: NEGATIVE
MUCOUS THREADS UR QL AUTO: ABNORMAL
NITRITE UR QL STRIP: NEGATIVE
NON-SQ EPI CELLS URNS QL MICRO: ABNORMAL /HPF
NONHDLC SERPL-MCNC: 184 MG/DL
PH UR STRIP.AUTO: 6 [PH]
POTASSIUM SERPL-SCNC: 4.2 MMOL/L (ref 3.5–5.3)
PROT SERPL-MCNC: 7.5 G/DL (ref 6.4–8.4)
PROT UR STRIP-MCNC: ABNORMAL MG/DL
PSA SERPL-MCNC: 0.44 NG/ML (ref 0–4)
RBC #/AREA URNS AUTO: ABNORMAL /HPF
SODIUM SERPL-SCNC: 141 MMOL/L (ref 135–147)
SP GR UR STRIP.AUTO: 1.02 (ref 1–1.03)
TRIGL SERPL-MCNC: 189 MG/DL
UROBILINOGEN UR STRIP-ACNC: <2 MG/DL
WBC #/AREA URNS AUTO: ABNORMAL /HPF

## 2024-02-23 PROCEDURE — G0103 PSA SCREENING: HCPCS

## 2024-02-23 PROCEDURE — 81001 URINALYSIS AUTO W/SCOPE: CPT

## 2024-02-23 PROCEDURE — 36415 COLL VENOUS BLD VENIPUNCTURE: CPT

## 2024-02-23 PROCEDURE — 80053 COMPREHEN METABOLIC PANEL: CPT

## 2024-02-23 PROCEDURE — 80061 LIPID PANEL: CPT

## 2024-02-23 PROCEDURE — 83036 HEMOGLOBIN GLYCOSYLATED A1C: CPT

## 2024-02-26 ENCOUNTER — OFFICE VISIT (OUTPATIENT)
Dept: PHYSICAL THERAPY | Facility: CLINIC | Age: 49
End: 2024-02-26
Payer: OTHER MISCELLANEOUS

## 2024-02-26 DIAGNOSIS — S83.241D OTHER TEAR OF MEDIAL MENISCUS OF RIGHT KNEE AS CURRENT INJURY, SUBSEQUENT ENCOUNTER: Primary | ICD-10-CM

## 2024-02-26 PROCEDURE — 97530 THERAPEUTIC ACTIVITIES: CPT | Performed by: PHYSICAL THERAPIST

## 2024-02-26 PROCEDURE — 97112 NEUROMUSCULAR REEDUCATION: CPT | Performed by: PHYSICAL THERAPIST

## 2024-02-26 PROCEDURE — 97110 THERAPEUTIC EXERCISES: CPT | Performed by: PHYSICAL THERAPIST

## 2024-02-26 PROCEDURE — 97140 MANUAL THERAPY 1/> REGIONS: CPT | Performed by: PHYSICAL THERAPIST

## 2024-02-26 NOTE — PROGRESS NOTES
"Daily Note     Today's date: 2024  Patient name: Neil Kirk  : 1975  MRN: [id number]  Referring provider: Angi Ann PA-C  Dx:   Encounter Diagnosis     ICD-10-CM    1. Other tear of medial meniscus of right knee as current injury, subsequent encounter  S83.241D                      Subjective: patient reports no issues after last session, he mentions he has been feeling really good. Gives himself 80-85% improvement since starting PT.      Objective: See treatment diary below      Assessment: Tolerated treatment well. He was able to increase repetitions for LAQ and SLR today. He does require cueing for mini squats to ensure equal weight between both LE. Patient demonstrated fatigue post treatment, exhibited good technique with therapeutic exercises, and would benefit from continued PT      Plan: Continue per plan of care.  Progress treatment as tolerated.       Precautions: R medial menisectomy 24    Access Code: [email or web address]  URL: [email or web address]  Date: 2024  Prepared by: Bear Rivera    POC expires Unit limit Auth Expiration date PT/OT/ST + Visit Limit?   3/11 BOMN Pending Pending                           Visit/Unit Tracking  AUTH Status:  Date 1/18/24 1/22 1/25 2/1 2/5 2/8 2/12 2/15 2/19 2/26     30 Used 1 2 3 4 5 6 7 8 9 10      Remaining  29 28 27 26 25 24 23 22 21 20         Manuals 24 2/1 2/5 2/8 2/12 2/15 2/19 2/26   Inferior patellar mobilizations GM AF PROM GM GM AF PROM GM inferior  GM inferior GM inferior gr 2-3 GM inferior Gr 3/4 GM inferior Gr 3/4                                          Neuro Re-Ed             Desensitization  AF           Quad set 10x 3\" 2x10 & prone 3\" 20x supine 20x x20  20x 20x 20x 20x   Weight shift    To SLS 10x To SLS 10\"x10  SLS 10\"X10 SLS 10\"X10 SLS 30\"x2 SLS 30\"x2                                                                    Ther Ex             Pt education  AF GM GM AF GM GM KB GM SK   Knee PROM " "GM AF GM GM AF GM GM KB GM SK   Heel slides 10x 5\"x10           Gastroc stretch 30\"X4 30'x4           SAQ  2x8 2x8 20x 2x10 3x10 30X 3x10 3x10 3x10   LAQ  2x10 2x10  3x10 2x10 20X 2x10 2x10 3x10   Standing hip abduction   20x          Bike for ROM    5 mins 5' 6' 7' 7' 8' 8'   SLR    AAROM 5' AAROM 2x10 AAROM 2x10 AAROM 2x10 AROM 2x10 AROM 2x10 AROM 3x10   HSS      Seated and supine 10\"X10 Seated 30\"x4 Seated 30\"x4                  Ther Activity             Mini squat    20x  20x 20x 20x 30x 30x   Step up      6\" 20x 20x 6\" 20x 30x 6\" 6\" x30   Gait Training                                       Modalities             CP PRN                                               "

## 2024-02-28 ENCOUNTER — APPOINTMENT (OUTPATIENT)
Dept: PHYSICAL THERAPY | Facility: CLINIC | Age: 49
End: 2024-02-28
Payer: OTHER MISCELLANEOUS

## 2024-03-04 DIAGNOSIS — E78.5 HYPERLIPIDEMIA, UNSPECIFIED HYPERLIPIDEMIA TYPE: Primary | ICD-10-CM

## 2024-03-05 RX ORDER — ATORVASTATIN CALCIUM 40 MG/1
40 TABLET, FILM COATED ORAL DAILY
Qty: 90 TABLET | Refills: 3 | Status: SHIPPED | OUTPATIENT
Start: 2024-03-05

## 2024-03-19 ENCOUNTER — DOCUMENTATION (OUTPATIENT)
Dept: OBGYN CLINIC | Facility: CLINIC | Age: 49
End: 2024-03-19

## 2024-03-19 NOTE — PROGRESS NOTES
Received a fax from Joie Ward from [facility] requesting office notes and rtw letter from 3/15/24 appt with , I sent back a fax to her stating pt cx appt via [employer] and he is rs to 4/12

## 2024-04-01 DIAGNOSIS — U07.1 COVID-19 VIRUS INFECTION: Primary | ICD-10-CM

## 2024-04-12 ENCOUNTER — OFFICE VISIT (OUTPATIENT)
Dept: OBGYN CLINIC | Facility: CLINIC | Age: 49
End: 2024-04-12

## 2024-04-12 VITALS
WEIGHT: 258 LBS | HEART RATE: 105 BPM | DIASTOLIC BLOOD PRESSURE: 72 MMHG | SYSTOLIC BLOOD PRESSURE: 108 MMHG | BODY MASS INDEX: 40.49 KG/M2 | HEIGHT: 67 IN

## 2024-04-12 DIAGNOSIS — Z48.89 AFTERCARE FOLLOWING SURGERY: ICD-10-CM

## 2024-04-12 DIAGNOSIS — Z98.890 S/P RIGHT KNEE ARTHROSCOPY: Primary | ICD-10-CM

## 2024-04-12 NOTE — LETTER
April 12, 2024     Patient: Neil Kirk  YOB: 1975  Date of Visit: 4/12/2024      To Whom it May Concern:    Neil Kirk is under my professional care. Neil was seen in my office on 4/12/2024. Neil may return to work without restrictions at this time. He will follow up as needed.    If you have any questions or concerns, please don't hesitate to call.         Sincerely,          Abner Magallon DO        CC: No Recipients

## 2024-04-12 NOTE — PROGRESS NOTES
"Patient Name:  Neil Kirk  MRN:  [id number]    Assessment & Plan     1. S/P right knee arthroscopy    2. Aftercare following surgery      Approximately 3 month s/p Right knee arthroscopic partial medial menisectomy performed on 01/05/2024  Overall, patient doing well s/p surgical intervention  At this time, patient may return to work without restrictions  He may continue activities as tolerated and progress at the gym, starting with light weights and progress as tolerated.  Continue OTC medications as needed for pain relief  Work note provided  Follow up as needed. If symptoms return or worsen, can call office for appointment and consideration of CSI.     History of the Present Illness   Neil Kirk is a 48 y.o. male approximately 3 month s/p Right knee arthroscopic partial medial menisectomy performed on 01/05/2024. Today, patient reports he is feeling about 80% improvement since surgery. He is back to the gym and starting lifting again. He is slow moving at work, but feeling good day to day. He occasionally takes OTC medications as needed for pain relief.              Review of Systems     Review of Systems   Constitutional:  Negative for chills and fever.   HENT:  Negative for ear pain and sore throat.    Eyes:  Negative for pain and visual disturbance.   Respiratory:  Negative for cough and shortness of breath.    Cardiovascular:  Negative for chest pain and palpitations.   Gastrointestinal:  Negative for abdominal pain and vomiting.   Genitourinary:  Negative for dysuria and hematuria.   Musculoskeletal:  Negative for arthralgias and back pain.   Skin:  Negative for color change and rash.   Neurological:  Negative for seizures and syncope.   All other systems reviewed and are negative.      Physical Exam     /72   Pulse 105   Ht 5' 7\" (1.702 m)   Wt 117 kg (258 lb)   BMI 40.41 kg/m²     Right Knee  Surgical incisions well healed  Range of motion from 0 to 120 degrees without " pain.    There is no effusion.    There is residual tenderness over the medial joint line.    The patient is able to perform a straight leg raise with 5/5 quad strength.     Varus stress testing reveals no pain or instability at 0 and 30 degrees   Valgus stress testing reveals no pain or instability at 0 and 30 degrees  Calf soft, compressible and nontender  The patient is neurovascular intact distally.      Data Review     I have personally reviewed pertinent films in PACS, and my interpretation follows.    No new images    Social History     Tobacco Use    Smoking status: Every Day     Types: Cigars   Vaping Use    Vaping status: Every Day    Substances: Nicotine   Substance Use Topics    Alcohol use: Yes     Comment: social    Drug use: No           Procedures  None     Angi Ann PA-C

## 2024-10-18 ENCOUNTER — APPOINTMENT (OUTPATIENT)
Dept: RADIOLOGY | Facility: CLINIC | Age: 49
End: 2024-10-18
Payer: COMMERCIAL

## 2024-10-18 ENCOUNTER — OFFICE VISIT (OUTPATIENT)
Dept: OBGYN CLINIC | Facility: CLINIC | Age: 49
End: 2024-10-18
Payer: OTHER MISCELLANEOUS

## 2024-10-18 VITALS
HEART RATE: 80 BPM | HEIGHT: 67 IN | WEIGHT: 262.4 LBS | OXYGEN SATURATION: 98 % | DIASTOLIC BLOOD PRESSURE: 85 MMHG | SYSTOLIC BLOOD PRESSURE: 131 MMHG | BODY MASS INDEX: 41.18 KG/M2

## 2024-10-18 DIAGNOSIS — Z98.890 S/P RIGHT KNEE ARTHROSCOPY: ICD-10-CM

## 2024-10-18 DIAGNOSIS — M17.11 PRIMARY OSTEOARTHRITIS OF RIGHT KNEE: Primary | ICD-10-CM

## 2024-10-18 DIAGNOSIS — M25.561 RIGHT KNEE PAIN, UNSPECIFIED CHRONICITY: ICD-10-CM

## 2024-10-18 DIAGNOSIS — M17.11 PRIMARY OSTEOARTHRITIS OF RIGHT KNEE: ICD-10-CM

## 2024-10-18 PROCEDURE — 99214 OFFICE O/P EST MOD 30 MIN: CPT | Performed by: ORTHOPAEDIC SURGERY

## 2024-10-18 PROCEDURE — 73564 X-RAY EXAM KNEE 4 OR MORE: CPT

## 2024-10-18 RX ORDER — IBUPROFEN 600 MG/1
600 TABLET, FILM COATED ORAL EVERY 6 HOURS PRN
Qty: 30 TABLET | Refills: 0 | Status: SHIPPED | OUTPATIENT
Start: 2024-10-18

## 2024-10-18 NOTE — PROGRESS NOTES
Patient Name:  Neil Kirk  MRN:  [id number]    Assessment & Plan     1. Primary osteoarthritis of right knee  -     XR knee 4+ vw right injury; Future; Expected date: 10/18/2024  -     ibuprofen (MOTRIN) 600 mg tablet; Take 1 tablet (600 mg total) by mouth every 6 (six) hours as needed for mild pain  2. S/P right knee arthroscopy  -     XR knee 4+ vw right injury; Future; Expected date: 10/18/2024  -     ibuprofen (MOTRIN) 600 mg tablet; Take 1 tablet (600 mg total) by mouth every 6 (six) hours as needed for mild pain  3. Right knee pain, unspecified chronicity      Right knee osteoarthritis, approximately 9.5 month s/p Right knee arthroscopic partial medial menisectomy performed on 01/05/2024  Treatment options were discussed in the form of oral and topical medications, corticosteroid injections, visco injections, physical therapy, bracing, PRP injection  Patient declined corticosteroid today  Avoid aggravating activities. use lighter weights on leg press, avoid deep squatting, consider elliptical for cardio  Discussed weight loss to offload pressure on knee joint.   Can consider referral to Dr. Reagan for consideration for PRP injection  Ibuprofen 600 mg prescribed to pharmacy on file. Potential side effects discussed in detail.  He will follow up as needed    History of the Present Illness   Neil Kirk is a 49 y.o. adult approximately 9.5 month s/p Right knee arthroscopic partial medial menisectomy performed on 01/05/2024. Today, patient reports pain has gradually returned over the past 3-4 months. He has increased pain with walking distances or working out. He has been taking ibuprofen and tylenol. He continues his home exercises. He continues working with full duty. Pain located in his medial knee. He is also having numbness in his medial knee. His pain today is 8/10.            Review of Systems     Review of Systems   Constitutional:  Negative for chills and fever.   HENT:  Negative  "for ear pain and sore throat.    Eyes:  Negative for pain and visual disturbance.   Respiratory:  Negative for cough and shortness of breath.    Cardiovascular:  Negative for chest pain and palpitations.   Gastrointestinal:  Negative for abdominal pain and vomiting.   Genitourinary:  Negative for dysuria and hematuria.   Musculoskeletal:  Negative for arthralgias and back pain.   Skin:  Negative for color change and rash.   Neurological:  Negative for seizures and syncope.   All other systems reviewed and are negative.      Physical Exam     /85   Pulse 80   Ht 5' 7\" (1.702 m)   Wt 119 kg (262 lb 6.4 oz)   SpO2 98%   BMI 41.10 kg/m²     Right Knee  Range of motion from 0 to 120.    There is no crepitus with range of motion.   There is no effusion.    There is tenderness over the medial joint line.    There is 5/5 quadriceps strength and equal tone.    The patient is able to perform a straight leg raise.    Varus stress testing reveals no instability at 0 and 30 degrees   Valgus stress testing reveals no instability at 0 and 30 degrees  The patient is neurovascular intact distally.        Data Review     I have personally reviewed pertinent films in PACS, and my interpretation follows.    X-rays of the right knee obtained 10/18/2024 demonstrate patellofemoral and medial compartment degenerative changes with joint space narrowing and osteophyte formation with subtle increase in medial joint space narrowing when compared to previous right knee x-rays from 10/25/2023.    Social History     Tobacco Use    Smoking status: Every Day     Types: Cigars   Vaping Use    Vaping status: Every Day    Substances: Nicotine   Substance Use Topics    Alcohol use: Yes     Comment: social    Drug use: No           Procedures  None    Abner Magallon DO   Scribe Attestation      I,:  Tamika Sanchez am acting as a scribe while in the presence of the attending physician.:       I,:  Abner Magallon DO personally performed " the services described in this documentation    as scribed in my presence.:

## 2025-02-28 ENCOUNTER — OFFICE VISIT (OUTPATIENT)
Dept: FAMILY MEDICINE CLINIC | Facility: CLINIC | Age: 50
End: 2025-02-28
Payer: COMMERCIAL

## 2025-02-28 VITALS
SYSTOLIC BLOOD PRESSURE: 148 MMHG | TEMPERATURE: 97.8 F | WEIGHT: 262 LBS | OXYGEN SATURATION: 97 % | DIASTOLIC BLOOD PRESSURE: 90 MMHG | HEIGHT: 67 IN | HEART RATE: 78 BPM | BODY MASS INDEX: 41.12 KG/M2

## 2025-02-28 DIAGNOSIS — E66.813 CLASS 3 SEVERE OBESITY WITHOUT SERIOUS COMORBIDITY IN ADULT, UNSPECIFIED BMI, UNSPECIFIED OBESITY TYPE (HCC): ICD-10-CM

## 2025-02-28 DIAGNOSIS — Z12.5 SCREENING FOR PROSTATE CANCER: ICD-10-CM

## 2025-02-28 DIAGNOSIS — E66.01 CLASS 3 SEVERE OBESITY WITHOUT SERIOUS COMORBIDITY IN ADULT, UNSPECIFIED BMI, UNSPECIFIED OBESITY TYPE (HCC): ICD-10-CM

## 2025-02-28 DIAGNOSIS — Z12.11 COLON CANCER SCREENING: ICD-10-CM

## 2025-02-28 DIAGNOSIS — E78.5 HYPERLIPIDEMIA, UNSPECIFIED HYPERLIPIDEMIA TYPE: ICD-10-CM

## 2025-02-28 DIAGNOSIS — R53.83 OTHER FATIGUE: ICD-10-CM

## 2025-02-28 DIAGNOSIS — I10 ESSENTIAL HYPERTENSION: ICD-10-CM

## 2025-02-28 DIAGNOSIS — Z13.1 SCREENING FOR DIABETES MELLITUS: ICD-10-CM

## 2025-02-28 DIAGNOSIS — S83.241D OTHER TEAR OF MEDIAL MENISCUS OF RIGHT KNEE AS CURRENT INJURY, SUBSEQUENT ENCOUNTER: ICD-10-CM

## 2025-02-28 DIAGNOSIS — I10 ESSENTIAL HYPERTENSION: Primary | ICD-10-CM

## 2025-02-28 PROBLEM — R31.9 HEMATURIA: Status: RESOLVED | Noted: 2024-02-20 | Resolved: 2025-02-28

## 2025-02-28 PROBLEM — S82.872K: Status: RESOLVED | Noted: 2020-08-23 | Resolved: 2025-02-28

## 2025-02-28 PROCEDURE — 99214 OFFICE O/P EST MOD 30 MIN: CPT | Performed by: FAMILY MEDICINE

## 2025-02-28 RX ORDER — IBUPROFEN 800 MG/1
TABLET, FILM COATED ORAL
Qty: 90 TABLET | Refills: 3 | Status: SHIPPED | OUTPATIENT
Start: 2025-02-28

## 2025-02-28 RX ORDER — TIRZEPATIDE 2.5 MG/.5ML
2.5 INJECTION, SOLUTION SUBCUTANEOUS WEEKLY
Qty: 2 ML | Refills: 3 | Status: SHIPPED | OUTPATIENT
Start: 2025-02-28 | End: 2025-03-02

## 2025-02-28 NOTE — ASSESSMENT & PLAN NOTE
Pt has attempted diet and lifestyle modifications without benefit, and they would like to start weight loss medication at this time  Pt denies Hx or Fhx of medullary thyroid carcinoma, Hx or Fhx of thyroid cancer of any kind, Hx of multiple endocrine neoplasia syndrome type 2 (MEN 2), or Hx of pancreatitis and understands the benefits of this medication as well as side effects prior to starting. Recommended alcohol prep pads for cleansing of the area prior to injection.       After discussing risks and benefits of medication along with side effects will start the following:     Orders:  •  tirzepatide (Zepbound) 2.5 mg/0.5 mL auto-injector; Inject 0.5 mL (2.5 mg total) under the skin once a week for 28 days

## 2025-02-28 NOTE — ASSESSMENT & PLAN NOTE
Orders:  •  ibuprofen (MOTRIN) 800 mg tablet; Take 1 tablet by mouth 3 times daily, then take as needed. Take with food and water. Discontinue if stomach upset occurs.

## 2025-02-28 NOTE — PROGRESS NOTES
Name: Neil Kirk      : 1975      MRN: [id number]  Encounter Provider: Adrian Butcher MD  Encounter Date: 2025   Encounter department: [facility]  :  Assessment & Plan  Essential hypertension  Recommend weight loss and DASH diet    Colon cancer screening    Orders:  •  Cologuard    Class 3 severe obesity without serious comorbidity in adult, unspecified BMI, unspecified obesity type (HCC)  Pt has attempted diet and lifestyle modifications without benefit, and they would like to start weight loss medication at this time  Pt denies Hx or Fhx of medullary thyroid carcinoma, Hx or Fhx of thyroid cancer of any kind, Hx of multiple endocrine neoplasia syndrome type 2 (MEN 2), or Hx of pancreatitis and understands the benefits of this medication as well as side effects prior to starting. Recommended alcohol prep pads for cleansing of the area prior to injection.       After discussing risks and benefits of medication along with side effects will start the following:     Orders:  •  tirzepatide (Zepbound) 2.5 mg/0.5 mL auto-injector; Inject 0.5 mL (2.5 mg total) under the skin once a week for 28 days    Hyperlipidemia, unspecified hyperlipidemia type    Orders:  •  Lipid panel; Future    Screening for prostate cancer    Orders:  •  PSA, Total Screen; Future    Other fatigue    Orders:  •  CBC and differential; Future  •  TSH, 3rd generation with Free T4 reflex; Future    Screening for diabetes mellitus    Orders:  •  Comprehensive metabolic panel; Future    Other tear of medial meniscus of right knee as current injury, subsequent encounter    Orders:  •  ibuprofen (MOTRIN) 800 mg tablet; Take 1 tablet by mouth 3 times daily, then take as needed. Take with food and water. Discontinue if stomach upset occurs.    Follow up in 3-6 months       History of Present Illness   Patient is here due to elevated BP readings. Denies any side effects related to this.  Also he is obese and is  "interested in losing weight.      Review of Systems   Constitutional:  Negative for activity change, appetite change, fatigue and fever.   HENT:  Negative for congestion and ear discharge.    Respiratory:  Negative for cough and shortness of breath.    Cardiovascular:  Negative for chest pain and palpitations.   Gastrointestinal:  Negative for diarrhea and nausea.   Musculoskeletal:  Negative for arthralgias and back pain.   Skin:  Negative for color change and rash.   Neurological:  Negative for dizziness and headaches.   Psychiatric/Behavioral:  Negative for agitation and behavioral problems.        Objective   /90   Pulse 78   Temp 97.8 °F (36.6 °C)   Ht 5' 7\" (1.702 m)   Wt 119 kg (262 lb)   SpO2 97%   BMI 41.04 kg/m²      Physical Exam  Vitals and nursing note reviewed.   Constitutional:       General: He is not in acute distress.     Appearance: He is well-developed.   HENT:      Head: Normocephalic and atraumatic.   Eyes:      Conjunctiva/sclera: Conjunctivae normal.   Cardiovascular:      Rate and Rhythm: Normal rate and regular rhythm.      Heart sounds: No murmur heard.  Pulmonary:      Effort: Pulmonary effort is normal. No respiratory distress.      Breath sounds: Normal breath sounds.   Abdominal:      Palpations: Abdomen is soft.      Tenderness: There is no abdominal tenderness.   Musculoskeletal:         General: No swelling.      Cervical back: Neck supple.   Skin:     General: Skin is warm and dry.      Capillary Refill: Capillary refill takes less than 2 seconds.   Neurological:      Mental Status: He is alert.   Psychiatric:         Mood and Affect: Mood normal.         "

## 2025-03-01 LAB
ALBUMIN SERPL-MCNC: 4.3 G/DL (ref 3.5–5.7)
ALP SERPL-CCNC: 92 U/L (ref 35–120)
ALT SERPL-CCNC: 39 U/L
ANION GAP SERPL CALCULATED.3IONS-SCNC: 8 MMOL/L (ref 3–11)
AST SERPL-CCNC: 18 U/L
BASOPHILS # BLD AUTO: 0 THOU/CMM (ref 0–0.1)
BASOPHILS NFR BLD AUTO: 1 %
BILIRUB SERPL-MCNC: 0.9 MG/DL (ref 0.2–1)
BUN SERPL-MCNC: 15 MG/DL (ref 7–28)
CALCIUM SERPL-MCNC: 9.2 MG/DL (ref 8.5–10.5)
CHLORIDE SERPL-SCNC: 104 MMOL/L (ref 100–109)
CHOLEST SERPL-MCNC: 216 MG/DL
CHOLEST/HDLC SERPL: 4.6 {RATIO}
CO2 SERPL-SCNC: 27 MMOL/L (ref 21–31)
CREAT SERPL-MCNC: 0.82 MG/DL (ref 0.53–1.3)
CYTOLOGY CMNT CVX/VAG CYTO-IMP: NORMAL
DIFFERENTIAL METHOD BLD: NORMAL
EOSINOPHIL # BLD AUTO: 0.1 THOU/CMM (ref 0–0.5)
EOSINOPHIL NFR BLD AUTO: 1 %
ERYTHROCYTE [DISTWIDTH] IN BLOOD BY AUTOMATED COUNT: 12.8 % (ref 12–16)
GFR/BSA.PRED SERPLBLD CYS-BASED-ARV: 106 ML/MIN/{1.73_M2}
GLUCOSE SERPL-MCNC: 97 MG/DL (ref 65–99)
HCT VFR BLD AUTO: 46.1 % (ref 37–48)
HDLC SERPL-MCNC: 47 MG/DL (ref 23–92)
HGB BLD-MCNC: 16.4 G/DL (ref 12.5–17)
LDLC SERPL CALC-MCNC: 148 MG/DL
LYMPHOCYTES # BLD AUTO: 2 THOU/CMM (ref 1–3)
LYMPHOCYTES NFR BLD AUTO: 34 %
MCH RBC QN AUTO: 32.3 PG (ref 27–36)
MCHC RBC AUTO-ENTMCNC: 35.4 G/DL (ref 32–37)
MCV RBC AUTO: 91 FL (ref 80–100)
MONOCYTES # BLD AUTO: 0.6 THOU/CMM (ref 0.3–1)
MONOCYTES NFR BLD AUTO: 10 %
NEUTROPHILS # BLD AUTO: 3.3 THOU/CMM (ref 1.8–7.8)
NEUTROPHILS NFR BLD AUTO: 54 %
NONHDLC SERPL-MCNC: 169 MG/DL
PLATELET # BLD AUTO: 228 THOU/CMM (ref 140–350)
PMV BLD REES-ECKER: 8.3 FL (ref 7.5–11.3)
POTASSIUM SERPL-SCNC: 4.3 MMOL/L (ref 3.5–5.2)
PROT SERPL-MCNC: 7.3 G/DL (ref 6.3–8.3)
PSA SERPL-MCNC: 0.79 NG/ML
RBC # BLD AUTO: 5.07 MILL/CMM (ref 4–5.4)
SODIUM SERPL-SCNC: 139 MMOL/L (ref 135–145)
TRIGL SERPL-MCNC: 103 MG/DL
TSH SERPL-ACNC: 2 UIU/ML (ref 0.45–5.33)
WBC # BLD AUTO: 6.1 THOU/CMM (ref 4–10.5)

## 2025-03-02 ENCOUNTER — RESULTS FOLLOW-UP (OUTPATIENT)
Dept: FAMILY MEDICINE CLINIC | Facility: CLINIC | Age: 50
End: 2025-03-02

## 2025-03-02 DIAGNOSIS — E66.813 CLASS 3 SEVERE OBESITY WITHOUT SERIOUS COMORBIDITY IN ADULT, UNSPECIFIED BMI, UNSPECIFIED OBESITY TYPE (HCC): ICD-10-CM

## 2025-03-02 DIAGNOSIS — E78.5 HYPERLIPIDEMIA, UNSPECIFIED HYPERLIPIDEMIA TYPE: ICD-10-CM

## 2025-03-02 DIAGNOSIS — I10 ESSENTIAL HYPERTENSION: ICD-10-CM

## 2025-03-02 DIAGNOSIS — E66.01 CLASS 3 SEVERE OBESITY WITHOUT SERIOUS COMORBIDITY IN ADULT, UNSPECIFIED BMI, UNSPECIFIED OBESITY TYPE (HCC): ICD-10-CM

## 2025-03-02 DIAGNOSIS — S83.241D OTHER TEAR OF MEDIAL MENISCUS OF RIGHT KNEE AS CURRENT INJURY, SUBSEQUENT ENCOUNTER: ICD-10-CM

## 2025-03-02 DIAGNOSIS — E66.01 CLASS 3 SEVERE OBESITY WITHOUT SERIOUS COMORBIDITY IN ADULT, UNSPECIFIED BMI, UNSPECIFIED OBESITY TYPE (HCC): Primary | ICD-10-CM

## 2025-03-02 DIAGNOSIS — E66.813 CLASS 3 SEVERE OBESITY WITHOUT SERIOUS COMORBIDITY IN ADULT, UNSPECIFIED BMI, UNSPECIFIED OBESITY TYPE (HCC): Primary | ICD-10-CM

## 2025-03-02 RX ORDER — TIRZEPATIDE 2.5 MG/.5ML
2.5 INJECTION, SOLUTION SUBCUTANEOUS WEEKLY
Refills: 3 | OUTPATIENT
Start: 2025-03-02 | End: 2025-03-30

## 2025-03-03 ENCOUNTER — TELEPHONE (OUTPATIENT)
Dept: FAMILY MEDICINE CLINIC | Facility: CLINIC | Age: 50
End: 2025-03-03

## 2025-03-03 DIAGNOSIS — E78.5 HYPERLIPIDEMIA, UNSPECIFIED HYPERLIPIDEMIA TYPE: ICD-10-CM

## 2025-03-03 RX ORDER — ATORVASTATIN CALCIUM 80 MG/1
80 TABLET, FILM COATED ORAL DAILY
Qty: 90 TABLET | Refills: 3 | Status: SHIPPED | OUTPATIENT
Start: 2025-03-03

## 2025-03-03 RX ORDER — IBUPROFEN 800 MG/1
TABLET, FILM COATED ORAL
Qty: 90 TABLET | Refills: 0 | OUTPATIENT
Start: 2025-03-03

## 2025-03-03 RX ORDER — SEMAGLUTIDE 0.25 MG/.5ML
INJECTION, SOLUTION SUBCUTANEOUS
Refills: 2 | OUTPATIENT
Start: 2025-03-03

## 2025-03-03 NOTE — TELEPHONE ENCOUNTER
----- Message from Adrian Butcher MD sent at 3/2/2025  8:47 AM EST -----  His cholesterol was again elevated. Was zepbound approved by his insurance?

## 2025-03-04 DIAGNOSIS — I10 ESSENTIAL HYPERTENSION: Primary | ICD-10-CM

## 2025-03-04 RX ORDER — AMLODIPINE BESYLATE 5 MG/1
5 TABLET ORAL DAILY
Qty: 30 TABLET | Refills: 5 | Status: SHIPPED | OUTPATIENT
Start: 2025-03-04

## 2025-03-04 NOTE — TELEPHONE ENCOUNTER
PA for WEGOVY) 0.25 MG/0.5ML SUBMITTED to     via    []CMM-KEY:   [x]Surescripts-Case ID # [id number]   []Availity-Auth ID # NDC #   []Faxed to plan   []Other website   []Phone call Case ID #     [x]PA sent as URGENT    All office notes, labs and other pertaining documents and studies sent. Clinical questions answered. Awaiting determination from insurance company.     Turnaround time for your insurance to make a decision on your Prior Authorization can take 7-21 business days.

## 2025-03-05 NOTE — TELEPHONE ENCOUNTER
PA for Wegovy 0.25 mg  EXCLUDED from plan       Reason:(Screenshot if applicable)        Message sent to office clinical pool Yes    Appeal is not warranted unless the following is met:    DX of DELROY and the below cardiac events:   You have established cardiovascular disease as evidenced by one of the following:    (1) Prior myocardial infarction    (2) Prior ischemic or hemorrhagic stroke    (3) Symptomatic peripheral arterial disease evidenced by one of the following:     (A) Intermittent claudication with ankle-brachial index less than 0.85 (at rest)     (B) Peripheral arterial revascularization procedure     (C) Amputation due to atherosclerotic disease

## 2025-03-12 LAB — COLOGUARD RESULT REPORTABLE: NEGATIVE

## 2025-03-13 ENCOUNTER — RESULTS FOLLOW-UP (OUTPATIENT)
Dept: FAMILY MEDICINE CLINIC | Facility: CLINIC | Age: 50
End: 2025-03-13

## 2025-04-25 DIAGNOSIS — I10 ESSENTIAL HYPERTENSION: ICD-10-CM

## 2025-04-25 RX ORDER — AMLODIPINE BESYLATE 5 MG/1
5 TABLET ORAL DAILY
Qty: 90 TABLET | Refills: 1 | Status: SHIPPED | OUTPATIENT
Start: 2025-04-25

## 2025-07-04 DIAGNOSIS — S83.241D OTHER TEAR OF MEDIAL MENISCUS OF RIGHT KNEE AS CURRENT INJURY, SUBSEQUENT ENCOUNTER: ICD-10-CM

## 2025-07-06 RX ORDER — IBUPROFEN 800 MG/1
TABLET, FILM COATED ORAL
Qty: 90 TABLET | Refills: 3 | Status: SHIPPED | OUTPATIENT
Start: 2025-07-06

## (undated) DEVICE — LIGHT GLOVE GREEN

## (undated) DEVICE — LUBRICANT SURGILUBE TUBE 4 OZ  FLIP TOP

## (undated) DEVICE — LIGHT HANDLE COVER CAMERA DISP

## (undated) DEVICE — SPECIMEN CONTAINER STERILE PEEL PACK

## (undated) DEVICE — GLOVE SRG BIOGEL 7.5

## (undated) DEVICE — BASKET STONE RTRVL ZERO TIP 2.4FR

## (undated) DEVICE — PADDING CAST 4 IN  COTTON STRL

## (undated) DEVICE — TUBING ARTHROSCOPIC WAVE  MAIN PUMP

## (undated) DEVICE — COBAN 4 IN STERILE

## (undated) DEVICE — GAUZE SPONGES,16 PLY: Brand: CURITY

## (undated) DEVICE — UROCATCH BAG

## (undated) DEVICE — 4-PORT MANIFOLD: Brand: NEPTUNE 2

## (undated) DEVICE — GLOVE INDICATOR PI UNDERGLOVE SZ 7.5 BLUE

## (undated) DEVICE — SPONGE LAP 18 X 18 IN

## (undated) DEVICE — SCD SEQUENTIAL COMPRESSION COMFORT SLEEVE MEDIUM KNEE LENGTH: Brand: KENDALL SCD

## (undated) DEVICE — LIGHT HANDLE COVER SLEEVE DISP BLUE STELLAR

## (undated) DEVICE — CHLORHEXIDINE 4PCT 4 OZ

## (undated) DEVICE — BETHLEHEM UNIVERSAL  ARTHRO PK: Brand: CARDINAL HEALTH

## (undated) DEVICE — STERILE CYSTO PACK: Brand: CARDINAL HEALTH

## (undated) DEVICE — 3M™ STERI-DRAPE™ U-DRAPE 1015: Brand: STERI-DRAPE™

## (undated) DEVICE — CHLORAPREP HI-LITE 26ML ORANGE

## (undated) DEVICE — GUIDEWIRE STRGHT TIP 0.035 IN  SOLO PLUS

## (undated) DEVICE — INVIEW CLEAR LEGGINGS: Brand: CONVERTORS

## (undated) DEVICE — OCCLUSIVE GAUZE STRIP,3% BISMUTH TRIBROMOPHENATE IN PETROLATUM BLEND: Brand: XEROFORM

## (undated) DEVICE — Device

## (undated) DEVICE — ACE WRAP 6 IN UNSTERILE

## (undated) DEVICE — IMPERVIOUS STOCKINETTE: Brand: DEROYAL

## (undated) DEVICE — URETERAL CATHETER POLLACK OPEN ENDED 5FR

## (undated) DEVICE — DRAPE EQUIPMENT RF WAND

## (undated) DEVICE — 3M™ STERI-STRIP™ REINFORCED ADHESIVE SKIN CLOSURES, R1547, 1/2 IN X 4 IN (12 MM X 100 MM), 6 STRIPS/ENVELOPE: Brand: 3M™ STERI-STRIP™

## (undated) DEVICE — INTENDED FOR TISSUE SEPARATION, AND OTHER PROCEDURES THAT REQUIRE A SHARP SURGICAL BLADE TO PUNCTURE OR CUT.: Brand: BARD-PARKER ® CARBON RIB-BACK BLADES

## (undated) DEVICE — SHEATH URETERAL ACCESS 12/14FR 35CM PROXIS

## (undated) DEVICE — SUT ETHILON 3-0 PS-1 18 IN 1663G

## (undated) DEVICE — GLOVE INDICATOR PI UNDERGLOVE SZ 7 BLUE

## (undated) DEVICE — PACK TUR

## (undated) DEVICE — ABDOMINAL PAD: Brand: DERMACEA

## (undated) DEVICE — NEEDLE 25GA X 1 IN SAFETY GLIDE

## (undated) DEVICE — TUBING SUCTION 5MM X 12 FT